# Patient Record
Sex: FEMALE | Race: WHITE | Employment: OTHER | URBAN - METROPOLITAN AREA
[De-identification: names, ages, dates, MRNs, and addresses within clinical notes are randomized per-mention and may not be internally consistent; named-entity substitution may affect disease eponyms.]

---

## 2017-01-13 RX ORDER — ERGOCALCIFEROL 1.25 MG/1
CAPSULE ORAL
Qty: 26 CAP | Refills: 0 | Status: SHIPPED | OUTPATIENT
Start: 2017-01-13 | End: 2022-05-25

## 2018-06-04 PROBLEM — N81.6 RECTOCELE: Status: ACTIVE | Noted: 2018-06-04

## 2018-06-04 PROBLEM — N39.46 MIXED INCONTINENCE: Status: ACTIVE | Noted: 2018-06-04

## 2018-06-04 PROBLEM — R35.1 NOCTURIA: Status: ACTIVE | Noted: 2018-06-04

## 2018-06-04 PROBLEM — N32.81 URGENCY-FREQUENCY SYNDROME: Status: ACTIVE | Noted: 2018-06-04

## 2018-09-13 PROBLEM — Z98.890 POST-OPERATIVE STATE: Status: ACTIVE | Noted: 2018-09-13

## 2018-10-03 PROBLEM — N32.81 OVERACTIVE BLADDER: Status: ACTIVE | Noted: 2018-10-03

## 2018-10-13 PROBLEM — Z98.890 POST-OPERATIVE STATE: Status: RESOLVED | Noted: 2018-09-13 | Resolved: 2018-10-13

## 2019-07-18 ENCOUNTER — OFFICE VISIT (OUTPATIENT)
Dept: INTERNAL MEDICINE CLINIC | Age: 69
End: 2019-07-18

## 2019-07-18 VITALS
WEIGHT: 134.38 LBS | DIASTOLIC BLOOD PRESSURE: 78 MMHG | SYSTOLIC BLOOD PRESSURE: 135 MMHG | HEIGHT: 64 IN | HEART RATE: 66 BPM | RESPIRATION RATE: 16 BRPM | BODY MASS INDEX: 22.94 KG/M2 | TEMPERATURE: 98 F | OXYGEN SATURATION: 99 %

## 2019-07-18 DIAGNOSIS — E03.9 HYPOTHYROIDISM, UNSPECIFIED TYPE: ICD-10-CM

## 2019-07-18 DIAGNOSIS — E55.9 VITAMIN D DEFICIENCY: ICD-10-CM

## 2019-07-18 DIAGNOSIS — M85.80 OSTEOPENIA, UNSPECIFIED LOCATION: Primary | ICD-10-CM

## 2019-07-18 DIAGNOSIS — Z00.00 WELL ADULT HEALTH CHECK: ICD-10-CM

## 2019-07-18 DIAGNOSIS — N95.9 POST MENOPAUSAL PROBLEMS: ICD-10-CM

## 2019-07-18 RX ORDER — ESTRADIOL 1 MG/1
1 TABLET ORAL DAILY
Qty: 90 TAB | Refills: 0 | Status: SHIPPED | OUTPATIENT
Start: 2019-07-18 | End: 2019-07-18 | Stop reason: SDUPTHER

## 2019-07-18 RX ORDER — ESTRADIOL 1 MG/1
1 TABLET ORAL DAILY
Qty: 90 TAB | Refills: 0 | Status: SHIPPED | OUTPATIENT
Start: 2019-07-18 | End: 2019-12-09 | Stop reason: SDUPTHER

## 2019-07-18 NOTE — PATIENT INSTRUCTIONS
1.  Once labs result will sent levothyroxine refill to mail-order pharmacy. 2.  Will clarify colonoscopy interval with GI as reviewed. 3.  We can update your Medicare Annual Wellness Visit on schedule once due.

## 2019-07-18 NOTE — PROGRESS NOTES
RM 17    Patient not fasting at this time, had protein shake for breakfast. Patient has concerns with Vit D, states she was taking a high dose for years. Chief Complaint   Patient presents with    New Patient    Thyroid Problem     1. Have you been to the ER, urgent care clinic since your last visit? Hospitalized since your last visit? No    2. Have you seen or consulted any other health care providers outside of the [address] [place] [address] Aj since your last visit? Include any pap smears or colon screening. Yes Reason for visit: GP in [place], annual check ups (unsure of last office visit)     Health Maintenance Due   Topic Date Due    Hepatitis C Screening  1950    DTaP/Tdap/Td series (1 - Tdap) 06/27/1971    Shingrix Vaccine Age 50> (1 of 2) 06/27/2000    GLAUCOMA SCREENING Q2Y  06/27/2015    Pneumococcal 65+ years (2 of 2 - PPSV23) 09/09/2016    BREAST CANCER SCRN MAMMOGRAM  02/01/2017    COLONOSCOPY  02/03/2017    MEDICARE YEARLY EXAM  03/14/2018       Abuse Screening Questionnaire 7/18/2019   Do you ever feel afraid of your partner? N   Are you in a relationship with someone who physically or mentally threatens you? N   Is it safe for you to go home?  Y     3 most recent PHQ Screens 7/18/2019   Little interest or pleasure in doing things Not at all   Feeling down, depressed, irritable, or hopeless Not at all   Total Score PHQ 2 0     ADL Assessment 7/18/2019   Feeding yourself No Help Needed   Getting from bed to chair No Help Needed   Getting dressed No Help Needed   Bathing or showering No Help Needed   Walk across the room (includes cane/walker) No Help Needed   Using the telphone No Help Needed   Taking your medications No Help Needed   Preparing meals No Help Needed   Managing money (expenses/bills) No Help Needed   Moderately strenuous housework (laundry) No Help Needed   Shopping for personal items (toiletries/medicines) No Help Needed   Shopping for groceries No Help Needed   Driving No Help Needed   Climbing a flight of stairs No Help Needed   Getting to places beyond walking distances No Help Needed       Learning Assessment 7/18/2019   PRIMARY LEARNER Patient   BARRIERS PRIMARY LEARNER NONE   PRIMARY LANGUAGE ENGLISH   LEARNER PREFERENCE PRIMARY READING     LISTENING     VIDEOS   ANSWERED BY patient   RELATIONSHIP SELF

## 2019-07-18 NOTE — TELEPHONE ENCOUNTER
Pt here for visit. Updating labs tomorrow, and plan refill thyroid medication once labs result to mail-order.

## 2019-07-18 NOTE — PROGRESS NOTES
History of Present Illness:   Aj Villatoro is a 71 y.o. female here for evaluation:    Chief Complaint   Patient presents with    New Patient    Thyroid Problem     Here to establish care. Last seen by me 2015. Last thyroid refill in [facility] 2017 with Dr. Emma Salmon for thyroid mgt. She notes moved back recently from [facility]. She notes no need to see Dr. Emma Salmon at this time. She has been on vit D 50K weekly for vitamin D repletion. She is not sure if protein shake she had today had fat in it. Reviewed labs and monitoring. Nursing screenings reviewed by provider at visit. Prior to Admission medications    Medication Sig Start Date End Date Taking? Authorizing Provider   VITAMIN D2 50,000 unit capsule TAKE ONE CAPSULE BY MOUTH ONCE A WEEK  Patient taking differently: 2,000 Units. 1/13/17  Yes Jessa Way MD   levothyroxine (SYNTHROID) 88 mcg tablet TAKE 1 TABLET DAILY 6 DAYS A WEEK AND ONE-HALF (1/2) TABLET 1 DAY PER WEEK AS DIRECTED 6/30/16  Yes Jessa Way MD   omeprazole (PRILOSEC) 20 mg capsule Take 20 mg by mouth daily. Indications: 3x/weekly   Yes Provider, Historical   estradiol (ESTRACE) 1 mg tablet Take 1 mg by mouth daily. Yes Provider, Historical   GLUCOSAMINE SULFATE (GLUCOSAMINE PO) Take 2 Tabs by mouth daily. Yes Provider, Historical        ROS  Complete ROS reviewed by provider and scanned to chart. Vitals:    07/18/19 0941   BP: 135/78   Pulse: 66   Resp: 16   Temp: 98 °F (36.7 °C)   TempSrc: Oral   SpO2: 99%   Weight: 134 lb 6 oz (61 kg)   Height: 5' 4\" (1.626 m)   PainSc:   0 - No pain      Body mass index is 23.07 kg/m². Physical Exam:     Physical Exam   Constitutional: She appears well-developed and well-nourished. No distress. HENT:   Head: Normocephalic and atraumatic. Eyes: Conjunctivae are normal. Right eye exhibits no discharge. Left eye exhibits no discharge. No scleral icterus. Neck: Normal range of motion. Neck supple.  No tracheal deviation present. No thyromegaly present. Cardiovascular: Normal rate, regular rhythm, normal heart sounds and intact distal pulses. Exam reveals no gallop and no friction rub. No murmur heard. Pulmonary/Chest: Effort normal and breath sounds normal. No stridor. No respiratory distress. She has no wheezes. She has no rales. She exhibits no tenderness. Abdominal: Soft. Bowel sounds are normal. She exhibits no distension. There is no tenderness. Musculoskeletal: She exhibits no edema or tenderness. Lymphadenopathy:     She has no cervical adenopathy. Neurological: She is alert. She exhibits normal muscle tone. Coordination normal.   Skin: Skin is warm. No rash noted. She is not diaphoretic. No erythema. No pallor. Psychiatric: She has a normal mood and affect. Her behavior is normal. Judgment and thought content normal.       Assessment and Plan:       ICD-10-CM ICD-9-CM    1. Osteopenia, unspecified location M85.80 733.90 CBC WITH AUTOMATED DIFF      METABOLIC PANEL, COMPREHENSIVE      estradiol (ESTRACE) 1 mg tablet      DISCONTINUED: estradiol (ESTRACE) 1 mg tablet   2. Well adult health check Z00.00 V70.0 LIPID PANEL      HEMOGLOBIN A1C WITH EAG   3. Vitamin D deficiency E55.9 268.9 VITAMIN D, 25 HYDROXY   4. Hypothyroidism, unspecified type E03.9 244.9 LIPID PANEL      TSH AND FREE T4   5. Post menopausal problems N95.9 627.9 estradiol (ESTRACE) 1 mg tablet             Updating labs reviewed. 5.  Continue/refill pending eval with Gyn reviewed. Long-term use and concerns reviewed at visit. Follow-up and Dispositions    · Return in about 3 months (around 10/18/2019) for medication follow-up.       lab results and schedule of future lab studies reviewed with patient  reviewed medications and side effects in detail    For additional documentation of information and/or recommendations discussed this visit, please see notes in instructions.       Plan and evaluation (above) reviewed with pt at visit  Patient voiced understanding of plan and provided with time to ask/review questions. After Visit Summary (AVS) provided to pt after visit with additional instructions as needed/reviewed.

## 2019-07-22 ENCOUNTER — HOSPITAL ENCOUNTER (OUTPATIENT)
Dept: LAB | Age: 69
Discharge: HOME | End: 2019-07-22
Payer: MEDICARE

## 2019-07-22 PROCEDURE — 84443 ASSAY THYROID STIM HORMONE: CPT

## 2019-07-22 PROCEDURE — 80061 LIPID PANEL: CPT

## 2019-07-22 PROCEDURE — 85025 COMPLETE CBC W/AUTO DIFF WBC: CPT

## 2019-07-22 PROCEDURE — 80053 COMPREHEN METABOLIC PANEL: CPT

## 2019-07-22 PROCEDURE — 82306 VITAMIN D 25 HYDROXY: CPT

## 2019-07-22 PROCEDURE — 36415 COLL VENOUS BLD VENIPUNCTURE: CPT

## 2019-07-22 PROCEDURE — 83036 HEMOGLOBIN GLYCOSYLATED A1C: CPT

## 2019-08-02 RX ORDER — LEVOTHYROXINE SODIUM 88 UG/1
TABLET ORAL
Qty: 90 TAB | Refills: 1 | Status: SHIPPED | OUTPATIENT
Start: 2019-08-02 | End: 2019-10-26 | Stop reason: SDUPTHER

## 2019-08-02 NOTE — TELEPHONE ENCOUNTER
Refill request(s) approved--levothyroxine. Based on recent labs, increase from 88mcg (taking 6.5 days/week) to 88mcg daily (7 days/week). MyChart message to pt--with lab results and recommendations/plan.

## 2019-10-18 ENCOUNTER — HOSPITAL ENCOUNTER (OUTPATIENT)
Dept: LAB | Age: 69
Discharge: HOME | End: 2019-10-18
Payer: MEDICARE

## 2019-10-18 ENCOUNTER — OFFICE VISIT (OUTPATIENT)
Dept: INTERNAL MEDICINE CLINIC | Age: 69
End: 2019-10-18

## 2019-10-18 VITALS
RESPIRATION RATE: 14 BRPM | BODY MASS INDEX: 22.92 KG/M2 | WEIGHT: 134.25 LBS | DIASTOLIC BLOOD PRESSURE: 61 MMHG | OXYGEN SATURATION: 99 % | HEIGHT: 64 IN | SYSTOLIC BLOOD PRESSURE: 112 MMHG | TEMPERATURE: 97.5 F | HEART RATE: 69 BPM

## 2019-10-18 DIAGNOSIS — Z12.39 SCREENING FOR BREAST CANCER: ICD-10-CM

## 2019-10-18 DIAGNOSIS — M79.642 PAIN IN BOTH HANDS: ICD-10-CM

## 2019-10-18 DIAGNOSIS — M79.641 PAIN IN BOTH HANDS: ICD-10-CM

## 2019-10-18 DIAGNOSIS — E03.9 HYPOTHYROIDISM, UNSPECIFIED TYPE: Primary | ICD-10-CM

## 2019-10-18 DIAGNOSIS — Z12.11 SCREENING FOR MALIGNANT NEOPLASM OF COLON: ICD-10-CM

## 2019-10-18 DIAGNOSIS — Z12.31 ENCOUNTER FOR SCREENING MAMMOGRAM FOR MALIGNANT NEOPLASM OF BREAST: ICD-10-CM

## 2019-10-18 PROCEDURE — 84443 ASSAY THYROID STIM HORMONE: CPT

## 2019-10-18 NOTE — PROGRESS NOTES
RM 17    Patient had flu and shingles vaccine through pharmacy. Patient would like mammogram ordered. Chief Complaint   Patient presents with    Medication Evaluation    Labs     review     1. Have you been to the ER, urgent care clinic since your last visit? Hospitalized since your last visit? No    2. Have you seen or consulted any other health care providers outside of the [address] [place] [address] since your last visit? Include any pap smears or colon screening. No    Health Maintenance Due   Topic Date Due    Hepatitis C Screening  1950    DTaP/Tdap/Td series (1 - Tdap) 06/27/1971    Shingrix Vaccine Age 50> (1 of 2) 06/27/2000    GLAUCOMA SCREENING Q2Y  06/27/2015    Pneumococcal 65+ years (2 of 2 - PPSV23) 09/09/2016    BREAST CANCER SCRN MAMMOGRAM  02/01/2017    COLONOSCOPY  02/03/2017    MEDICARE YEARLY EXAM  03/14/2018    Influenza Age 9 to Adult  08/01/2019     Abuse Screening Questionnaire 10/18/2019   Do you ever feel afraid of your partner? N   Are you in a relationship with someone who physically or mentally threatens you? N   Is it safe for you to go home?  Y     3 most recent PHQ Screens 10/18/2019   Little interest or pleasure in doing things Not at all   Feeling down, depressed, irritable, or hopeless Not at all   Total Score PHQ 2 0

## 2019-10-18 NOTE — PROGRESS NOTES
History of Present Illness:   Liam Blank is a 71 y.o. female here for evaluation:    Chief Complaint   Patient presents with    Medication Evaluation    Labs     review     Reviewed mammogram, hand arthritis, thyroid, medicare AWC--on since 65--not done here. Has not seen gyn. Still taking Estadiol. Notes prefers on exam, just mammogram.  Has total hysterectomy and no gyn care needed per pt otherwise. Nursing screenings reviewed by provider at visit. Prior to Admission medications    Medication Sig Start Date End Date Taking? Authorizing Provider   levothyroxine (SYNTHROID) 88 mcg tablet TAKE 1 TABLET DAILY 7 DAYS A WEEK. 8/2/19  Yes Alex Aranda MD   estradiol (ESTRACE) 1 mg tablet Take 1 Tab by mouth daily. 7/18/19  Yes Alex Aranda MD   VITAMIN D2 50,000 unit capsule TAKE ONE CAPSULE BY MOUTH ONCE A WEEK  Patient taking differently: 2,000 Units. 1/13/17  Yes Marcelino Puente MD   omeprazole (PRILOSEC) 20 mg capsule Take 20 mg by mouth daily. Indications: 3x/weekly   Yes Provider, Historical   GLUCOSAMINE SULFATE (GLUCOSAMINE PO) Take 2 Tabs by mouth daily. Yes Provider, Historical        ROS    Vitals:    10/18/19 1405   BP: 112/61   Pulse: 69   Resp: 14   Temp: 97.5 °F (36.4 °C)   TempSrc: Oral   SpO2: 99%   Weight: 134 lb 4 oz (60.9 kg)   Height: 5' 4\" (1.626 m)   PainSc:   0 - No pain      Body mass index is 23.04 kg/m². Physical Exam:     Physical Exam   Constitutional: She appears well-developed and well-nourished. No distress. HENT:   Head: Normocephalic and atraumatic. Eyes: Conjunctivae are normal. Right eye exhibits no discharge. Left eye exhibits no discharge. No scleral icterus. Neck: Neck supple. Cardiovascular: Normal rate, regular rhythm, normal heart sounds and intact distal pulses. Exam reveals no gallop and no friction rub. No murmur heard. Pulmonary/Chest: Effort normal and breath sounds normal. No respiratory distress.  She has no wheezes. She has no rales. She exhibits no tenderness. Abdominal: Soft. Bowel sounds are normal. She exhibits no distension. There is no tenderness. Musculoskeletal: She exhibits no edema or tenderness. Neurological: She is alert. She exhibits normal muscle tone. Coordination normal.   Skin: Skin is warm. No rash noted. She is not diaphoretic. No erythema. No pallor. Psychiatric: She has a normal mood and affect. Her behavior is normal. Judgment and thought content normal.       Assessment and Plan:       ICD-10-CM ICD-9-CM    1. Hypothyroidism, unspecified type E03.9 244.9 TSH AND FREE T4   2. Screening for breast cancer Z12.39 V76.10 PAMELA MAMMO BI SCREENING INCL CAD   3. Encounter for screening mammogram for malignant neoplasm of breast  Z12.31 V76.12 PAMELA MAMMO BI SCREENING INCL CAD   4. Pain in both hands M79.641 729.5     M79.642     5. Screening for malignant neoplasm of colon Z12.11 V76.51 REFERRAL TO GASTROENTEROLOGY      CANCELED: REFERRAL TO GASTROENTEROLOGY       1.  Updating labs reviewed. 2,3:  Screening/order reviewed. 5.  Referral to different provider in same group as Dr. Carolina Tinsley reviewed. She will call to clarify interval--not done while she was in [facility]. Follow-up and Dispositions    · Return in about 9 months (around 7/18/2020), or if symptoms worsen or fail to improve, for Medicare Wellness exam/visit, fasting labs. lab results and schedule of future lab studies reviewed with patient  reviewed medications and side effects in detail    Plan and evaluation (above) reviewed with pt at visit  Patient voiced understanding of plan and provided with time to ask/review questions. After Visit Summary (AVS) provided to pt after visit with additional instructions as needed/reviewed. No future appointments.

## 2019-10-19 LAB
T4 FREE SERPL-MCNC: 1.61 NG/DL (ref 0.82–1.77)
TSH SERPL DL<=0.005 MIU/L-ACNC: 1.24 UIU/ML (ref 0.45–4.5)

## 2019-11-02 RX ORDER — LEVOTHYROXINE SODIUM 88 UG/1
TABLET ORAL
Qty: 90 TAB | Refills: 1 | Status: SHIPPED | OUTPATIENT
Start: 2019-11-02 | End: 2020-04-10 | Stop reason: SDUPTHER

## 2019-11-03 NOTE — TELEPHONE ENCOUNTER
Refill request(s) approved--levothyroxine--same dose. MyChart message to pt--with lab results.     Component      Latest Ref Rng & Units 10/18/2019 7/22/2019           2:47 PM  8:28 AM   TSH      0.450 - 4.500 uIU/mL 1.240 6.710 (H)   T4, Free      0.82 - 1.77 ng/dL 1.61 1.19

## 2019-11-13 ENCOUNTER — HOSPITAL ENCOUNTER (OUTPATIENT)
Dept: MAMMOGRAPHY | Age: 69
Discharge: HOME | End: 2019-11-13
Attending: INTERNAL MEDICINE
Payer: MEDICARE

## 2019-11-13 DIAGNOSIS — Z12.31 VISIT FOR SCREENING MAMMOGRAM: ICD-10-CM

## 2019-11-13 PROCEDURE — 77067 SCR MAMMO BI INCL CAD: CPT

## 2019-12-09 DIAGNOSIS — M85.80 OSTEOPENIA, UNSPECIFIED LOCATION: ICD-10-CM

## 2019-12-09 DIAGNOSIS — N95.9 POST MENOPAUSAL PROBLEMS: ICD-10-CM

## 2019-12-28 RX ORDER — ESTRADIOL 1 MG/1
1 TABLET ORAL DAILY
Qty: 90 TAB | Refills: 0 | Status: SHIPPED | OUTPATIENT
Start: 2019-12-28

## 2019-12-28 NOTE — TELEPHONE ENCOUNTER
Pt plans to see gyn in next 90-days. Requested 90-day estradiol PO script. Refill request(s) approved--estradiol--to mail-order as requested. Requested Prescriptions     Signed Prescriptions Disp Refills    estradiol (ESTRACE) 1 mg tablet 90 Tab 0     Sig: Take 1 Tab by mouth daily.      Authorizing Provider: Lorena Linares

## 2020-04-11 RX ORDER — LEVOTHYROXINE SODIUM 88 UG/1
TABLET ORAL
Qty: 90 TAB | Refills: 1 | Status: SHIPPED | OUTPATIENT
Start: 2020-04-11 | End: 2020-08-17 | Stop reason: SDUPTHER

## 2020-04-20 ENCOUNTER — VIRTUAL VISIT (OUTPATIENT)
Dept: INTERNAL MEDICINE CLINIC | Age: 70
End: 2020-04-20

## 2020-04-20 DIAGNOSIS — E03.9 HYPOTHYROIDISM, UNSPECIFIED TYPE: ICD-10-CM

## 2020-04-20 DIAGNOSIS — Z00.00 MEDICARE ANNUAL WELLNESS VISIT, SUBSEQUENT: Primary | ICD-10-CM

## 2020-04-20 DIAGNOSIS — M85.80 OSTEOPENIA, UNSPECIFIED LOCATION: ICD-10-CM

## 2020-04-20 DIAGNOSIS — Z13.31 SCREENING FOR DEPRESSION: ICD-10-CM

## 2020-04-20 DIAGNOSIS — Z11.59 NEED FOR HEPATITIS C SCREENING TEST: ICD-10-CM

## 2020-04-20 DIAGNOSIS — E55.9 VITAMIN D DEFICIENCY: ICD-10-CM

## 2020-04-20 DIAGNOSIS — Z13.39 SCREENING FOR ALCOHOLISM: ICD-10-CM

## 2020-04-20 RX ORDER — DIPHENHYDRAMINE CITRATE AND IBUPROFEN 38; 200 MG/1; MG/1
TABLET, COATED ORAL DAILY
COMMUNITY

## 2020-04-20 RX ORDER — ZOSTER VACCINE RECOMBINANT, ADJUVANTED 50 MCG/0.5
KIT INTRAMUSCULAR
COMMUNITY
End: 2021-05-24

## 2020-04-20 RX ORDER — INFLUENZA VACCINE, ADJUVANTED 15; 15; 15 UG/.5ML; UG/.5ML; UG/.5ML
INJECTION, SUSPENSION INTRAMUSCULAR
COMMUNITY
End: 2021-05-24

## 2020-04-20 NOTE — PATIENT INSTRUCTIONS
Please have the eye exam records faxed to [phone], as reviewed/requested--once updated. Please have colonoscopy report faxed to us. Medicare Wellness Visit, Female The best way to live healthy is to have a lifestyle where you eat a well-balanced diet, exercise regularly, limit alcohol use, and quit all forms of tobacco/nicotine, if applicable. Regular preventive services are another way to keep healthy. Preventive services (vaccines, screening tests, monitoring & exams) can help personalize your care plan, which helps you manage your own care. Screening tests can find health problems at the earliest stages, when they are easiest to treat. [facility] follows the current, evidence-based guidelines published by the [facility] ([facility]) when recommending preventive services for our patients. Because we follow these guidelines, sometimes recommendations change over time as research supports it. (For example, mammograms used to be recommended annually. Even though Medicare will still pay for an annual mammogram, the newer guidelines recommend a mammogram every two years for women of average risk). Of course, you and your doctor may decide to screen more often for some diseases, based on your risk and your co-morbidities (chronic disease you are already diagnosed with). Preventive services for you include: - Medicare offers their members a free annual wellness visit, which is time for you and your primary care provider to discuss and plan for your preventive service needs. Take advantage of this benefit every year! 
-All adults over the age of 72 should receive the recommended pneumonia vaccines. Current USPSTF guidelines recommend a series of two vaccines for the best pneumonia protection.  
-All adults should have a flu vaccine yearly and a tetanus vaccine every 10 years. -All adults age 48 and older should receive the shingles vaccines (series of two vaccines). -All adults age 38-68 who are overweight should have a diabetes screening test once every three years.  
-All adults born between [birth year] and 1965 should be screened once for Hepatitis C. 
-Other screening tests and preventive services for persons with diabetes include: an eye exam to screen for diabetic retinopathy, a kidney function test, a foot exam, and stricter control over your cholesterol.  
-Cardiovascular screening for adults with routine risk involves an electrocardiogram (ECG) at intervals determined by your doctor.  
-Colorectal cancer screenings should be done for adults age 54-65 with no increased risk factors for colorectal cancer. There are a number of acceptable methods of screening for this type of cancer. Each test has its own benefits and drawbacks. Discuss with your doctor what is most appropriate for you during your annual wellness visit. The different tests include: colonoscopy (considered the best screening method), a fecal occult blood test, a fecal DNA test, and sigmoidoscopy. 
 
-A bone mass density test is recommended when a woman turns 65 to screen for osteoporosis. This test is only recommended one time, as a screening. Some providers will use this same test as a disease monitoring tool if you already have osteoporosis. -Breast cancer screenings are recommended every other year for women of normal risk, age 54-69. 
-Cervical cancer screenings for women over age 72 are only recommended with certain risk factors. Here is a list of your current Health Maintenance items (your personalized list of preventive services) with a due date: 
Health Maintenance Due Topic Date Due  
 Hepatitis C Test  1950  DTaP/Tdap/Td  (1 - Tdap) 06/27/1971  Glaucoma Screening   06/27/2015  Colonoscopy  02/03/2017 [facility] Annual Well Visit  03/14/2018 Medicare Wellness Visit, Female The best way to live healthy is to have a lifestyle where you eat a well-balanced diet, exercise regularly, limit alcohol use, and quit all forms of tobacco/nicotine, if applicable. Regular preventive services are another way to keep healthy. Preventive services (vaccines, screening tests, monitoring & exams) can help personalize your care plan, which helps you manage your own care. Screening tests can find health problems at the earliest stages, when they are easiest to treat. [facility] follows the current, evidence-based guidelines published by the [facility] ([facility]) when recommending preventive services for our patients. Because we follow these guidelines, sometimes recommendations change over time as research supports it. (For example, mammograms used to be recommended annually. Even though Medicare will still pay for an annual mammogram, the newer guidelines recommend a mammogram every two years for women of average risk). Of course, you and your doctor may decide to screen more often for some diseases, based on your risk and your co-morbidities (chronic disease you are already diagnosed with). Preventive services for you include: - Medicare offers their members a free annual wellness visit, which is time for you and your primary care provider to discuss and plan for your preventive service needs. Take advantage of this benefit every year! 
-All adults over the age of 72 should receive the recommended pneumonia vaccines. Current USPSTF guidelines recommend a series of two vaccines for the best pneumonia protection.  
-All adults should have a flu vaccine yearly and a tetanus vaccine every 10 years.  
-All adults age 48 and older should receive the shingles vaccines (series of two vaccines). -All adults age 38-68 who are overweight should have a diabetes screening test once every three years. -All adults born between 80 and 1965 should be screened once for Hepatitis C. 
-Other screening tests and preventive services for persons with diabetes include: an eye exam to screen for diabetic retinopathy, a kidney function test, a foot exam, and stricter control over your cholesterol.  
-Cardiovascular screening for adults with routine risk involves an electrocardiogram (ECG) at intervals determined by your doctor.  
-Colorectal cancer screenings should be done for adults age 54-65 with no increased risk factors for colorectal cancer. There are a number of acceptable methods of screening for this type of cancer. Each test has its own benefits and drawbacks. Discuss with your doctor what is most appropriate for you during your annual wellness visit. The different tests include: colonoscopy (considered the best screening method), a fecal occult blood test, a fecal DNA test, and sigmoidoscopy. 
 
-A bone mass density test is recommended when a woman turns 65 to screen for osteoporosis. This test is only recommended one time, as a screening. Some providers will use this same test as a disease monitoring tool if you already have osteoporosis. -Breast cancer screenings are recommended every other year for women of normal risk, age 54-69. 
-Cervical cancer screenings for women over age 72 are only recommended with certain risk factors. Here is a list of your current Health Maintenance items (your personalized list of preventive services) with a due date: 
Health Maintenance Due Topic Date Due  
 Hepatitis C Test  1950  DTaP/Tdap/Td  (1 - Tdap) 06/27/1971  Glaucoma Screening   06/27/2015  Colonoscopy  02/03/2017 [facility] Annual Well Visit  03/14/2018

## 2020-04-20 NOTE — PROGRESS NOTES
Geronimo Walton is a 71 y.o. female who was seen by synchronous (real-time) audio-video technology on 4/20/2020. Consent:  She and/or her healthcare decision maker is aware that this patient-initiated Telehealth encounter is a billable service, with coverage as determined by her insurance carrier. She is aware that she may receive a bill and has provided verbal consent to proceed: Yes    I was in the office while conducting this encounter. Subjective:   Geronimo Waltno was seen for Annual Wellness Visit      This is a Subsequent Medicare Annual Wellness Exam (AWV) (Performed 12 months after IPPE or effective date of Medicare Part B enrollment)    I have reviewed the patient's medical history in detail and updated the computerized patient record. History     Chief Complaint   Patient presents with    Annual Wellness Visit       No other concerns at this time. Plan to update labs again July 2020. She prefers to do labs here in July 2020. Past Medical History:   Diagnosis Date    Abnormal EKG Jan 2016    Possible anterior infarct--Ortho pre-op. New finding compared to prior EKG.  Encounter for screening colonoscopy 2/3/12    Dr Sellers-hemorrhoids and diverticulosis-repeat 5 years    GERD (gastroesophageal reflux disease)     Hypothyroidism     Kidney infection age 21    Osteopenia     at femoral neck T-score -1.4 in 2/10    Unspecified vitamin D deficiency     Varicella vaccine August 2011      Past Surgical History:   Procedure Laterality Date    ABDOMEN SURGERY PROC UNLISTED  2007    lap nissen fundoplication    APPENDECTOMY      HX GYN  1990    Hysterectomy    HX ORTHOPAEDIC  Feb 2014    Left shoulder arthroscopy and manipulation with lysis    HX ORTHOPAEDIC Right Jan 11, 2016    Small finger PIP join tfusion surgery (fracture)     HX TOTAL ABDOMINAL HYSTERECTOMY       Prior to Admission medications    Medication Sig Start Date End Date Taking?  Authorizing Provider levothyroxine (SYNTHROID) 88 mcg tablet TAKE 1 TABLET DAILY 7 DAYS A WEEK. 4/11/20  Yes Servando Salamanca MD   estradiol (ESTRACE) 1 mg tablet Take 1 Tab by mouth daily. 12/28/19  Yes Servando Salamanca MD   VITAMIN D2 50,000 unit capsule TAKE ONE CAPSULE BY MOUTH ONCE A WEEK  Patient taking differently: 2,000 Units. 1/13/17  Yes Momo Chen MD   omeprazole (PRILOSEC) 20 mg capsule Take 20 mg by mouth daily. Indications: 3x/weekly   Yes Provider, Historical   GLUCOSAMINE SULFATE (GLUCOSAMINE PO) Take 2 Tabs by mouth daily. Yes Provider, Historical   Ibuprofen-diphenhydrAMINE (Advil PM) 200-38 mg tab by Mouth/Throat route daily. Provider, Historical   varicella-zoster recombinant, PF, (Shingrix, PF,) 50 mcg/0.5 mL susr injection Shingrix (PF) 50 mcg/0.5 mL intramuscular suspension, kit    Provider, Historical   influenza vaccine 2019-20, 65 yrs+,,PF, (Fluad 2348-7740, 72 yr up,,PF,) syrg injection Fluad 2019-20 65yr up(PF)45 mcg(15 mcgx3)/0.5 mL intramuscular syringe   INJECT 0.5ML INTRAMUSCULARLY ONCE    Provider, Historical       No Known Allergies  Family History   Problem Relation Age of Onset    Osteoporosis Mother         hip fracture    Cancer Brother     Heart Disease Brother     Diabetes Maternal Aunt     Breast Cancer Maternal Aunt     Thyroid Disease Neg Hx      Social History     Tobacco Use    Smoking status: Never Smoker    Smokeless tobacco: Never Used   Substance Use Topics    Alcohol use:  Yes     Alcohol/week: 4.2 standard drinks     Types: 5 Glasses of wine per week     Patient Active Problem List   Diagnosis Code    Pure hypercholesterolemia E78.00    Osteopenia M85.80    GERD (gastroesophageal reflux disease) K21.9    Unspecified hypothyroidism E03.9    Diverticulosis K57.90    Vitamin D deficiency E55.9    Hypothyroidism E03.9    Unspecified vitamin D deficiency E55.9    Anemia D64.9       Depression Risk Factor Screening:     3 most recent PHQ Screens 4/20/2020   Little interest or pleasure in doing things Not at all   Feeling down, depressed, irritable, or hopeless Not at all   Total Score PHQ 2 0       Alcohol Risk Factor Screening:     Alcohol Risk Factor Screening:   Do you average 1 drink per night or more than 7 drinks a week:  Yes--wine nightly    On any one occasion in the past three months have you have had more than 3 drinks containing alcohol:  No    Functional Ability and Level of Safety:     Hearing Loss  Hearing is good. Activities of Daily Living  The home contains: grab bars  See below:    ADL Assessment 4/20/2020   Feeding yourself No Help Needed   Getting from bed to chair No Help Needed   Getting dressed No Help Needed   Bathing or showering No Help Needed   Walk across the room (includes cane/walker) No Help Needed   Using the telphone No Help Needed   Taking your medications No Help Needed   Preparing meals No Help Needed   Managing money (expenses/bills) No Help Needed   Moderately strenuous housework (laundry) No Help Needed   Shopping for personal items (toiletries/medicines) No Help Needed   Shopping for groceries No Help Needed   Driving No Help Needed   Climbing a flight of stairs No Help Needed   Getting to places beyond walking distances No Help Needed       Fall Risk  Fall Risk Assessment, last 12 mths 4/20/2020   Able to walk? Yes   Fall in past 12 months? No       Abuse Screen  Patient is not abused    Abuse Screening Questionnaire 4/20/2020   Do you ever feel afraid of your partner? N   Are you in a relationship with someone who physically or mentally threatens you? N   Is it safe for you to go home?  Y       Cognitive Screening   Evaluation of Cognitive Function:  Has your family/caregiver stated any concerns about your memory: no      Patient Care Team   Patient Care Team:  Radha Kiran MD as PCP - General (Internal Medicine)  Radha Kiran MD [facility] PCP [facility] Empaneled Provider    Advice/Referrals/Counseling Education and counseling provided:  Are appropriate based on today's review and evaluation  Colorectal cancer screening tests  Screening for glaucoma      Health Maintenance Due   Topic Date Due    Hepatitis C Screening  1950    DTaP/Tdap/Td series (1 - Tdap) 06/27/1971    GLAUCOMA SCREENING Q2Y  06/27/2015    Colonoscopy  02/03/2017    Medicare Yearly Exam  03/14/2018    Shingrix Vaccine Age 50> (2 of 2) 11/28/2019     Plan update Hep C with next labs. Has not done Tdap--will update in pharmacy as reviewed. She has eye exam upcoming and will have faxed to us once completed. She will have GI fax last colonoscopy--she had at [address] old and told no problems--thinks planned 10yr interval follow-up then. She has completed Shingrix--record able to be retrieved from external vaccines. ROS      No Known Allergies      PHYSICAL EXAMINATION:    Vital Signs: There were no vitals taken for this visit. Constitutional: [x] Appears well-developed and well-nourished [x] No apparent distress      Mental status: [x] Alert and awake  [x] Oriented [x] Able to follow commands       Eyes:   EOM    [x]  Normal      Sclera  [x]  Normal              Discharge [x]  None visible       HENT: [x] Normocephalic, atraumatic    [x] Mouth/Throat: Mucous membranes are moist    External Ears [x] Normal      Neck: [x] No visualized mass     Pulmonary/Chest: [x] Respiratory effort normal   [x] No visualized signs of difficulty breathing or respiratory distress    Musculoskeletal:  [x] Normal range of motion of neck    Neurological:        [x] No Facial Asymmetry (Cranial nerve 7 motor function) (limited exam due to video visit)          [x] No gaze palsy     Skin:        [x] No significant exanthematous lesions or discoloration noted on facial skin             Psychiatric:       [x] Normal Affect       Other pertinent observable physical exam findings:  None.       We discussed the expected course, resolution and complications of the diagnosis(es) in detail. Medication risks, benefits, costs, interactions, and alternatives were discussed as indicated. I advised her to contact the office if her condition worsens, changes or fails to improve as anticipated. She expressed understanding with the diagnosis(es) and plan. Pursuant to the emergency declaration under the [facility] [place] [address] waiver authority and the [employer] Act, this Virtual  Visit was conducted, with patient's consent, to reduce the patient's risk of exposure to COVID-19 and provide continuity of care for an established patient. Services were provided through a video synchronous discussion virtually to substitute for in-person clinic visit. Assessment & Plan:   Diagnoses and all orders for this visit:      ICD-10-CM ICD-9-CM    1. Medicare annual wellness visit, subsequent Z00.00 V70.0 CBC WITH AUTOMATED DIFF      METABOLIC PANEL, COMPREHENSIVE      LIPID PANEL      HEMOGLOBIN A1C WITH EAG   2. Screening for alcoholism Z13.39 V79.1    3. Screening for depression Z13.31 V79.0 DEPRESSION SCREEN ANNUAL   4. Need for hepatitis C screening test Z11.59 V73.89 HCV AB W/RFLX TO YADIRA   5. Osteopenia, unspecified location M85.80 733.90 VITAMIN D, 25 HYDROXY   6. Hypothyroidism, unspecified type E03.9 244.9 TSH AND FREE T4   7. Vitamin D deficiency E55.9 268.9 VITAMIN D, 25 HYDROXY       Diagnoses #1-4 for Medicare Wellness/Preventive visit. Due to significant separate problems unrelated to [facility] Wellness Visit, also addressed following diagnoses/problems at visit as below (diagnoses #5-7 above). 1, 4-7:  Plans fasting labs here--ordered labs above as future clinic collect to do here. 4.  Birth cohort screening reviewed. 5,6,7:  Monitoring, medications and follow-up reviewed.       Follow-up and Dispositions    · Return in about 3 months (around 7/20/2020) for fasting labs; 1yr for Annual Wellness visit.       lab results and schedule of future lab studies reviewed with patient  reviewed medications and side effects in detail    For additional documentation of information and/or recommendations discussed this visit, please see notes in instructions. Plan and evaluation (above) reviewed with pt at visit  Patient voiced understanding of plan and provided with time to ask/review questions. After Visit Summary (AVS) provided to pt after visit with additional instructions as needed/reviewed. AVS:  [x]  Sent to patient as YYogat message after visit. []  Mailed to patient after visit. []  Not sent to patient after visit. No future appointments.

## 2020-04-20 NOTE — PROGRESS NOTES
Virtual visit-doxy me video    Chief Complaint   Patient presents with   [address] [facility] Annual Wellness Visit     1. Have you been to the ER, urgent care clinic since your last visit? Hospitalized since your last visit? No    2. Have you seen or consulted any other health care providers outside of the [address] [place] [address] since your last visit? Include any pap smears or colon screening. No    Health Maintenance Due   Topic Date Due    Hepatitis C Screening  1950    DTaP/Tdap/Td series (1 - Tdap) 06/27/1971    GLAUCOMA SCREENING Q2Y  06/27/2015    Colonoscopy  02/03/2017    Medicare Yearly Exam  03/14/2018    Shingrix Vaccine Age 50> (2 of 2) 11/28/2019     ADL Assessment 4/20/2020   Feeding yourself No Help Needed   Getting from bed to chair No Help Needed   Getting dressed No Help Needed   Bathing or showering No Help Needed   Walk across the room (includes cane/walker) No Help Needed   Using the telphone No Help Needed   Taking your medications No Help Needed   Preparing meals No Help Needed   Managing money (expenses/bills) No Help Needed   Moderately strenuous housework (laundry) No Help Needed   Shopping for personal items (toiletries/medicines) No Help Needed   Shopping for groceries No Help Needed   Driving No Help Needed   Climbing a flight of stairs No Help Needed   Getting to places beyond walking distances No Help Needed       Abuse Screening Questionnaire 4/20/2020   Do you ever feel afraid of your partner? N   Are you in a relationship with someone who physically or mentally threatens you? N   Is it safe for you to go home? Y     3 most recent PHQ Screens 4/20/2020   Little interest or pleasure in doing things Not at all   Feeling down, depressed, irritable, or hopeless Not at all   Total Score PHQ 2 0     Fall Risk Assessment, last 12 mths 4/20/2020   Able to walk? Yes   Fall in past 12 months?  No

## 2020-08-19 RX ORDER — LEVOTHYROXINE SODIUM 88 UG/1
TABLET ORAL
Qty: 90 TAB | Refills: 1 | Status: SHIPPED | OUTPATIENT
Start: 2020-08-19 | End: 2021-03-20

## 2021-03-20 RX ORDER — LEVOTHYROXINE SODIUM 88 UG/1
TABLET ORAL
Qty: 90 TAB | Refills: 0 | Status: SHIPPED | OUTPATIENT
Start: 2021-03-20 | End: 2021-06-02 | Stop reason: SDUPTHER

## 2021-03-20 NOTE — TELEPHONE ENCOUNTER
Refill request(s) approved--levothyroxine--90-day supply with 0 refill(s). Refill protocol details (computer-generated) reviewed, as available. Draft[employer] message to pt--to schedule follow-up appt.

## 2021-05-24 ENCOUNTER — OFFICE VISIT (OUTPATIENT)
Dept: INTERNAL MEDICINE CLINIC | Age: 71
End: 2021-05-24
Payer: MEDICARE

## 2021-05-24 VITALS
OXYGEN SATURATION: 98 % | SYSTOLIC BLOOD PRESSURE: 118 MMHG | BODY MASS INDEX: 24.14 KG/M2 | TEMPERATURE: 97.9 F | DIASTOLIC BLOOD PRESSURE: 65 MMHG | WEIGHT: 141.4 LBS | RESPIRATION RATE: 16 BRPM | HEIGHT: 64 IN | HEART RATE: 70 BPM

## 2021-05-24 DIAGNOSIS — M85.80 OSTEOPENIA, UNSPECIFIED LOCATION: ICD-10-CM

## 2021-05-24 DIAGNOSIS — Z11.4 SCREENING FOR HIV WITHOUT PRESENCE OF RISK FACTORS: ICD-10-CM

## 2021-05-24 DIAGNOSIS — Z12.11 SCREENING FOR MALIGNANT NEOPLASM OF COLON: ICD-10-CM

## 2021-05-24 DIAGNOSIS — E55.9 VITAMIN D DEFICIENCY: ICD-10-CM

## 2021-05-24 DIAGNOSIS — Z00.00 MEDICARE ANNUAL WELLNESS VISIT, SUBSEQUENT: Primary | ICD-10-CM

## 2021-05-24 DIAGNOSIS — R79.9 ABNORMAL FINDING OF BLOOD CHEMISTRY, UNSPECIFIED: ICD-10-CM

## 2021-05-24 DIAGNOSIS — E03.9 HYPOTHYROIDISM, UNSPECIFIED TYPE: ICD-10-CM

## 2021-05-24 DIAGNOSIS — Z11.59 NEED FOR HEPATITIS C SCREENING TEST: ICD-10-CM

## 2021-05-24 DIAGNOSIS — Z13.31 SCREENING FOR DEPRESSION: ICD-10-CM

## 2021-05-24 PROCEDURE — G8399 PT W/DXA RESULTS DOCUMENT: HCPCS | Performed by: INTERNAL MEDICINE

## 2021-05-24 PROCEDURE — G8536 NO DOC ELDER MAL SCRN: HCPCS | Performed by: INTERNAL MEDICINE

## 2021-05-24 PROCEDURE — G8510 SCR DEP NEG, NO PLAN REQD: HCPCS | Performed by: INTERNAL MEDICINE

## 2021-05-24 PROCEDURE — 99213 OFFICE O/P EST LOW 20 MIN: CPT | Performed by: INTERNAL MEDICINE

## 2021-05-24 PROCEDURE — 1101F PT FALLS ASSESS-DOCD LE1/YR: CPT | Performed by: INTERNAL MEDICINE

## 2021-05-24 PROCEDURE — G0439 PPPS, SUBSEQ VISIT: HCPCS | Performed by: INTERNAL MEDICINE

## 2021-05-24 PROCEDURE — G8427 DOCREV CUR MEDS BY ELIG CLIN: HCPCS | Performed by: INTERNAL MEDICINE

## 2021-05-24 PROCEDURE — 1090F PRES/ABSN URINE INCON ASSESS: CPT | Performed by: INTERNAL MEDICINE

## 2021-05-24 PROCEDURE — G8420 CALC BMI NORM PARAMETERS: HCPCS | Performed by: INTERNAL MEDICINE

## 2021-05-24 PROCEDURE — G9899 SCRN MAM PERF RSLTS DOC: HCPCS | Performed by: INTERNAL MEDICINE

## 2021-05-24 PROCEDURE — 3017F COLORECTAL CA SCREEN DOC REV: CPT | Performed by: INTERNAL MEDICINE

## 2021-05-24 PROCEDURE — G0463 HOSPITAL OUTPT CLINIC VISIT: HCPCS | Performed by: INTERNAL MEDICINE

## 2021-05-24 RX ORDER — CHOLECALCIFEROL (VITAMIN D3) 125 MCG
CAPSULE ORAL
COMMUNITY

## 2021-05-24 NOTE — PROGRESS NOTES
Chief Complaint   Patient presents with   [facility] Annual Wellness Visit       1. Have you been to the ER, urgent care clinic since your last visit? Hospitalized since your last visit? No    2. Have you seen or consulted any other health care providers outside of the [address] [place] [address] since your last visit? Include any pap smears or colon screening.  Gyn 4/2021    Visit Vitals  /65 (BP 1 Location: Left arm, BP Patient Position: Sitting)   Pulse 70   Temp 97.9 °F (36.6 °C) (Oral)   Resp 16   Ht 5' 4\" (1.626 m)   Wt 141 lb 6.4 oz (64.1 kg)   SpO2 98%   BMI 24.27 kg/m²

## 2021-05-24 NOTE — PATIENT INSTRUCTIONS
1.  Please update Tdap (Tetanus, diphtheria and acellular pertussis/\"whooping cough\") vaccine in pharmacy as reviewed. Covered there with Medicare. 2.  If needed, you can use Debrox OTC for ear wax, or return here as needed for ear irrigation. Medicare Wellness Visit, Female The best way to live healthy is to have a lifestyle where you eat a well-balanced diet, exercise regularly, limit alcohol use, and quit all forms of tobacco/nicotine, if applicable. Regular preventive services are another way to keep healthy. Preventive services (vaccines, screening tests, monitoring & exams) can help personalize your care plan, which helps you manage your own care. Screening tests can find health problems at the earliest stages, when they are easiest to treat. [facility] follows the current, evidence-based guidelines published by the [facility] ([facility]) when recommending preventive services for our patients. Because we follow these guidelines, sometimes recommendations change over time as research supports it. (For example, mammograms used to be recommended annually. Even though Medicare will still pay for an annual mammogram, the newer guidelines recommend a mammogram every two years for women of average risk). Of course, you and your doctor may decide to screen more often for some diseases, based on your risk and your co-morbidities (chronic disease you are already diagnosed with). Preventive services for you include: - Medicare offers their members a free annual wellness visit, which is time for you and your primary care provider to discuss and plan for your preventive service needs. Take advantage of this benefit every year! 
-All adults over the age of 72 should receive the recommended pneumonia vaccines.  Current USPSTF guidelines recommend a series of two vaccines for the best pneumonia protection.  
-All adults should have a flu vaccine yearly and a tetanus vaccine every 10 years.  
-All adults age 48 and older should receive the shingles vaccines (series of two vaccines). -All adults age 38-68 who are overweight should have a diabetes screening test once every three years.  
-All adults born between [birth year] and 1965 should be screened once for Hepatitis C. 
-Other screening tests and preventive services for persons with diabetes include: an eye exam to screen for diabetic retinopathy, a kidney function test, a foot exam, and stricter control over your cholesterol.  
-Cardiovascular screening for adults with routine risk involves an electrocardiogram (ECG) at intervals determined by your doctor.  
-Colorectal cancer screenings should be done for adults age 54-65 with no increased risk factors for colorectal cancer. There are a number of acceptable methods of screening for this type of cancer. Each test has its own benefits and drawbacks. Discuss with your doctor what is most appropriate for you during your annual wellness visit. The different tests include: colonoscopy (considered the best screening method), a fecal occult blood test, a fecal DNA test, and sigmoidoscopy. 
 
-A bone mass density test is recommended when a woman turns 65 to screen for osteoporosis. This test is only recommended one time, as a screening. Some providers will use this same test as a disease monitoring tool if you already have osteoporosis. -Breast cancer screenings are recommended every other year for women of normal risk, age 54-69. 
-Cervical cancer screenings for women over age 72 are only recommended with certain risk factors. Here is a list of your current Health Maintenance items (your personalized list of preventive services) with a due date: 
Health Maintenance Due Topic Date Due  
    
 DTaP/Tdap/Td  (1 - Tdap) Never done  Colorectal Screening  02/03/2017

## 2021-05-24 NOTE — PROGRESS NOTES
Aria Davis (: 1950) is a 79 y.o. female, established patient, here for evaluation of the following chief complaint(s):  see below. This is a Subsequent Medicare Annual Wellness Exam (AWV) (Performed 12 months after IPPE or effective date of Medicare Part B enrollment)    I have reviewed the patient's medical history in detail and updated the computerized patient record. Assessment/Plan       ICD-10-CM ICD-9-CM    1. Medicare annual wellness visit, subsequent  Z00.00 V70.0 CBC WITH AUTOMATED DIFF      METABOLIC PANEL, COMPREHENSIVE      HEMOGLOBIN A1C WITH EAG      LIPID PANEL   2. Screening for depression  Z13.31 V79.0    3. Osteopenia, unspecified location  M85.80 733.90 VITAMIN D, 25 HYDROXY   4. Hypothyroidism, unspecified type  E03.9 244.9 T4, FREE      TSH 3RD GENERATION   5. Vitamin D deficiency  E55.9 268.9 VITAMIN D, 25 HYDROXY   6. Screening for malignant neoplasm of colon  Z12.11 V76.51 OCCULT BLOOD IMMUNOASSAY,DIAGNOSTIC      OCCULT BLOOD IMMUNOASSAY,DIAGNOSTIC   7. Abnormal finding of blood chemistry, unspecified   R79.9 790.6 CBC WITH AUTOMATED DIFF      HEMOGLOBIN A1C WITH EAG      LIPID PANEL   8. Need for hepatitis C screening test  Z11.59 V73.89 HEPATITIS C AB   9. Screening for HIV without presence of risk factors  Z11.4 V73.89 HIV 1/2 AG/AB, 4TH GENERATION,W RFLX CONFIRM       Diagnoses #1-2, 6,8,9 for Medicare Wellness/Preventive visit. Due to significant separate problems unrelated to [facility] Wellness Visit, also addressed following diagnoses/problems at visit as below (diagnoses #3-5,7 above). 1-2:  Screenings reviewed at visit. 3-5:  Monitoring labs reviewed. No refills needed at this time. 6.  Prefers FIT at this time over colonoscopy as reviewed below.   She may check on coverage for Cologuard prior to next FIT yearly screening as reviewed at visit--to confirm coverage prior to testing.    8,9:  HIV and Hep C screenings updated with labs at visit. Follow-up and Dispositions    · Return in about 1 year (around 5/24/2022), or if symptoms worsen or fail to improve, for Medicare Wellness exam/visit.       lab results and schedule of future lab studies reviewed with patient  reviewed medications and side effects in detail    For additional documentation of information and/or recommendations discussed this visit, please see notes in instructions. Plan and evaluation (above) reviewed with pt at visit  Patient voiced understanding of plan and provided with time to ask/review questions. After Visit Summary (AVS) provided to pt after visit with additional instructions as needed/reviewed. No future appointments. --Updated future visits after patient check-out. History     Chief Complaint   Patient presents with   Doris Gutiérrez Annual Wellness Visit       Notes (nursing/rooming note):  As above    Patient notes:  Questions about screening for colon cancer reviewed. Prefers to do as FIT testing   Reviewed prior colonoscopy in 2012--no polyps, just hemorrhoids and diverticuli. She has no FH, so average risk and Cologuard and FIT would both be acceptable. Reviewed Cologuard because she had seen ads and interest in this vs other colon cancer screenings. She prefers FIT at this time and provided/reviewed at visit. Notes arthritis hands and knees. Glucosamine is helping with knee arthritis. Reviewed naproxen instead of HS ibuprofen may have better pain/anti-inflammatory effect, and longer duration of action c/t ibuprofen. Nursing screenings reviewed by provider at visit. Past Medical History:   Diagnosis Date    Abnormal EKG Jan 2016    Possible anterior infarct--Ortho pre-op. New finding compared to prior EKG.     Encounter for screening colonoscopy 2/3/12    Dr Sellers-hemorrhoids and diverticulosis-repeat 5 years    GERD (gastroesophageal reflux disease)     Hypothyroidism     Kidney infection age 21    Osteopenia     at femoral neck T-score -1.4 in 2/10    Unspecified vitamin D deficiency     Varicella vaccine August 2011      Past Surgical History:   Procedure Laterality Date    HX GYN  1990    Hysterectomy    HX ORTHOPAEDIC  Feb 2014    Left shoulder arthroscopy and manipulation with lysis    HX ORTHOPAEDIC Right Jan 11, 2016    Small finger PIP join tfusion surgery (fracture)     HX TOTAL ABDOMINAL HYSTERECTOMY      [place] ABDOMEN SURG[place] PROC UNLISTED  2007    lap nissen fundoplication    [place] APPENDECTOMY       Prior to Admission medications    Medication Sig Start Date End Date Taking? Authorizing Provider   naproxen sodium (Aleve) 220 mg cap Take  by mouth. Yes Provider, Historical   levothyroxine (SYNTHROID) 88 mcg tablet TAKE 1 TABLET DAILY 7 DAYS A WEEK. 3/20/21  Yes Susan Perkins MD   Ibuprofen-diphenhydrAMINE (Advil PM) 200-38 mg tab by Mouth/Throat route daily. Yes Provider, Historical   estradiol (ESTRACE) 1 mg tablet Take 1 Tab by mouth daily. 12/28/19  Yes Susan Perkins MD   VITAMIN D2 50,000 unit capsule TAKE ONE CAPSULE BY MOUTH ONCE A WEEK  Patient taking differently: 50,000 Units. 1/13/17  Yes Gerardo Mckinney MD   GLUCOSAMINE SULFATE (GLUCOSAMINE PO) Take 2 Tabs by mouth daily. Yes Provider, Historical   varicella-zoster recombinant, PF, (Shingrix, PF,) 50 mcg/0.5 mL susr injection Shingrix (PF) 50 mcg/0.5 mL intramuscular suspension, kit  Patient not taking: Reported on 5/24/2021    Provider, Historical   influenza vaccine 2019-20, 65 yrs+,,PF, (Fluad 5623-0365, 65 yr up,,PF,) syrg injection Fluad 2019-20 65yr up(PF)45 mcg(15 mcgx3)/0.5 mL intramuscular syringe   INJECT 0.5ML INTRAMUSCULARLY ONCE  Patient not taking: Reported on 5/24/2021    Provider, Historical   omeprazole (PRILOSEC) 20 mg capsule Take 20 mg by mouth daily.  Indications: 3x/weekly  Patient not taking: Reported on 5/24/2021    Provider, Historical       No Known Allergies  Family History   Problem Relation Age of Onset    Osteoporosis Mother         hip fracture    Cancer Brother     Heart Disease Brother     Diabetes Maternal Aunt     Breast Cancer Maternal Aunt     Thyroid Disease Neg Hx      Social History     Tobacco Use    Smoking status: Never Smoker    Smokeless tobacco: Never Used   Substance Use Topics    Alcohol use: Yes     Alcohol/week: 4.2 standard drinks     Types: 5 Glasses of wine per week     Patient Active Problem List   Diagnosis Code    Pure hypercholesterolemia E78.00    Osteopenia M85.80    GERD (gastroesophageal reflux disease) K21.9    Unspecified hypothyroidism E03.9    Diverticulosis K57.90    Vitamin D deficiency E55.9    Hypothyroidism E03.9    Unspecified vitamin D deficiency E55.9    Anemia D64.9       Depression Risk Factor Screening:     3 most recent PHQ Screens 5/24/2021   Little interest or pleasure in doing things Not at all   Feeling down, depressed, irritable, or hopeless Not at all   Total Score PHQ 2 0       Alcohol Risk Factor Screening:   Do you average more than 1 drink per night or more than 7 drinks a week:  No  On any one occasion in the past three months have you have had more than 3 drinks containing alcohol:  No    Has one glass of wine daily with her . Functional Ability and Level of Safety:     Hearing Loss  Hearing is good. Activities of Daily Living  The home contains: no safety equipment needed for herself. Has in place for .   See below:    ADL Assessment 5/24/2021   Feeding yourself No Help Needed   Getting from bed to chair No Help Needed   Getting dressed No Help Needed   Bathing or showering No Help Needed   Walk across the room (includes cane/walker) No Help Needed   Using the telphone No Help Needed   Taking your medications No Help Needed   Preparing meals No Help Needed   Managing money (expenses/bills) No Help Needed   Moderately strenuous housework (laundry) No Help Needed   Shopping for personal items (toiletries/medicines) No Help Needed   Shopping for groceries No Help Needed   Driving No Help Needed   Climbing a flight of stairs No Help Needed   Getting to places beyond walking distances No Help Needed       Fall Risk  Fall Risk Assessment, last 12 mths 5/24/2021   Able to walk? Yes   Fall in past 12 months? 0   Do you feel unsteady? 0   Are you worried about falling 0       Abuse Screen  Patient is not abused    Abuse Screening Questionnaire 5/24/2021   Do you ever feel afraid of your partner? N   Are you in a relationship with someone who physically or mentally threatens you? N   Is it safe for you to go home? Y       Cognitive Screening   Evaluation of Cognitive Function:  Has your family/caregiver stated any concerns about your memory: no      Physical Exam[de-identified]     Blood pressure 118/65, pulse 70, temperature 97.9 °F (36.6 °C), temperature source Oral, resp. rate 16, height 5' 4\" (1.626 m), weight 141 lb 6.4 oz (64.1 kg), SpO2 98 %. General appearance: alert, cooperative, no distress, appears stated age  Head: Normocephalic, without obvious abnormality, atraumatic  Eyes: conjunctivae/corneas clear. Wearing glasses. Ears: normal TM's and external ear canals AU. Moderate wax right, mild left--not occluding canal/TM visualization. Neck: supple, symmetrical, trachea midline, no adenopathy, thyroid: not enlarged, symmetric, no tenderness/mass/nodules  Back: symmetric, no curvature. ROM normal  Lungs: clear to auscultation bilaterally  Chest wall: no tenderness  Heart: regular rate and rhythm, S1, S2 normal, no murmur, click, rub or gallop  Abdomen: soft, non-tender.  Bowel sounds normal. No masses,  no organomegaly  Extremities: extremities normal, atraumatic, no cyanosis or edema  Pulses: 2+ and symmetric radial.  Skin: Skin color, texture, turgor normal. No rashes or lesions  Lymph nodes: Cervical nodes normal.  Neurologic: Grossly normal    Patient Care Team   Patient Care Team:  Shanon Ferrera MD as PCP - General (Internal Medicine)  Suellen Robison MD as PCP - [facility] Empaneled Provider    Advice/Referrals/Counseling   Education and counseling provided:  Are appropriate based on today's review and evaluation    Health Maintenance Due   Topic Date Due    Hepatitis C Screening  Never done    DTaP/Tdap/Td series (1 - Tdap) Never done    Colorectal Cancer Screening Combo  02/03/2017    Medicare Yearly Exam  04/21/2021     --Hep B and HIV screening updated at visit. --She will update Tdap in pharmacy as reviewed.  --FIT testing preferred at this time. She feels \"out\" for 24hr after anesthesia, and is primary caregiver for  who has memory problems, so prefers not to do colonoscopy at this time. An electronic signature was used to authenticate this note.   -- Fadi Horne MD

## 2021-05-25 LAB
25(OH)D3 SERPL-MCNC: 43.7 NG/ML (ref 30–100)
ALBUMIN SERPL-MCNC: 3.7 G/DL (ref 3.5–5)
ALBUMIN/GLOB SERPL: 1.2 {RATIO} (ref 1.1–2.2)
ALP SERPL-CCNC: 45 U/L (ref 45–117)
ALT SERPL-CCNC: 18 U/L (ref 12–78)
ANION GAP SERPL CALC-SCNC: 4 MMOL/L (ref 5–15)
AST SERPL-CCNC: 7 U/L (ref 15–37)
BASOPHILS # BLD: 0 K/UL (ref 0–0.1)
BASOPHILS NFR BLD: 1 % (ref 0–1)
BILIRUB SERPL-MCNC: 0.4 MG/DL (ref 0.2–1)
BUN SERPL-MCNC: 23 MG/DL (ref 6–20)
BUN/CREAT SERPL: 36 (ref 12–20)
CALCIUM SERPL-MCNC: 9.1 MG/DL (ref 8.5–10.1)
CHLORIDE SERPL-SCNC: 106 MMOL/L (ref 97–108)
CHOLEST SERPL-MCNC: 208 MG/DL
CO2 SERPL-SCNC: 29 MMOL/L (ref 21–32)
CREAT SERPL-MCNC: 0.64 MG/DL (ref 0.55–1.02)
DIFFERENTIAL METHOD BLD: NORMAL
EOSINOPHIL # BLD: 0.1 K/UL (ref 0–0.4)
EOSINOPHIL NFR BLD: 2 % (ref 0–7)
ERYTHROCYTE [DISTWIDTH] IN BLOOD BY AUTOMATED COUNT: 12.8 % (ref 11.5–14.5)
EST. AVERAGE GLUCOSE BLD GHB EST-MCNC: 108 MG/DL
GLOBULIN SER CALC-MCNC: 3.1 G/DL (ref 2–4)
GLUCOSE SERPL-MCNC: 91 MG/DL (ref 65–100)
HBA1C MFR BLD: 5.4 % (ref 4–5.6)
HCT VFR BLD AUTO: 39.2 % (ref 35–47)
HCV AB SERPL QL IA: NONREACTIVE
HCV COMMENT,HCGAC: NORMAL
HDLC SERPL-MCNC: 74 MG/DL
HDLC SERPL: 2.8 {RATIO} (ref 0–5)
HGB BLD-MCNC: 12.9 G/DL (ref 11.5–16)
HIV 1+2 AB+HIV1 P24 AG SERPL QL IA: NONREACTIVE
HIV12 RESULT COMMENT, HHIVC: NORMAL
IMM GRANULOCYTES # BLD AUTO: 0 K/UL (ref 0–0.04)
IMM GRANULOCYTES NFR BLD AUTO: 0 % (ref 0–0.5)
LDLC SERPL CALC-MCNC: 103 MG/DL (ref 0–100)
LYMPHOCYTES # BLD: 1.5 K/UL (ref 0.8–3.5)
LYMPHOCYTES NFR BLD: 25 % (ref 12–49)
MCH RBC QN AUTO: 30.9 PG (ref 26–34)
MCHC RBC AUTO-ENTMCNC: 32.9 G/DL (ref 30–36.5)
MCV RBC AUTO: 93.8 FL (ref 80–99)
MONOCYTES # BLD: 0.5 K/UL (ref 0–1)
MONOCYTES NFR BLD: 8 % (ref 5–13)
NEUTS SEG # BLD: 4.1 K/UL (ref 1.8–8)
NEUTS SEG NFR BLD: 64 % (ref 32–75)
NRBC # BLD: 0 K/UL (ref 0–0.01)
NRBC BLD-RTO: 0 PER 100 WBC
PLATELET # BLD AUTO: 227 K/UL (ref 150–400)
PMV BLD AUTO: 9.6 FL (ref 8.9–12.9)
POTASSIUM SERPL-SCNC: 4.1 MMOL/L (ref 3.5–5.1)
PROT SERPL-MCNC: 6.8 G/DL (ref 6.4–8.2)
RBC # BLD AUTO: 4.18 M/UL (ref 3.8–5.2)
SODIUM SERPL-SCNC: 139 MMOL/L (ref 136–145)
T4 FREE SERPL-MCNC: 1.2 NG/DL (ref 0.8–1.5)
TRIGL SERPL-MCNC: 155 MG/DL (ref ?–150)
TSH SERPL DL<=0.05 MIU/L-ACNC: 3.42 UIU/ML (ref 0.36–3.74)
VLDLC SERPL CALC-MCNC: 31 MG/DL
WBC # BLD AUTO: 6.3 K/UL (ref 3.6–11)

## 2021-05-29 LAB
HEMOCCULT STL QL IA: NEGATIVE
SPECIMEN STATUS REPORT, ROLRST: NORMAL

## 2021-05-31 ENCOUNTER — PATIENT MESSAGE (OUTPATIENT)
Dept: INTERNAL MEDICINE CLINIC | Age: 71
End: 2021-05-31

## 2021-06-06 RX ORDER — LEVOTHYROXINE SODIUM 88 UG/1
TABLET ORAL
Qty: 90 TABLET | Refills: 0 | OUTPATIENT
Start: 2021-06-06

## 2021-06-06 RX ORDER — LEVOTHYROXINE SODIUM 88 UG/1
TABLET ORAL
Qty: 90 TABLET | Refills: 3 | Status: SHIPPED | OUTPATIENT
Start: 2021-06-06 | End: 2021-08-07

## 2021-06-06 NOTE — TELEPHONE ENCOUNTER
Refill request(s) approved--levothryoxine--90-day supply with 3 refill(s). Refill protocol details (computer-generated) reviewed, as available. Labs resulted in [employer] message to pt on 6/1 and read by pt on 6/2.

## 2021-08-07 RX ORDER — LEVOTHYROXINE SODIUM 88 UG/1
TABLET ORAL
Qty: 90 TABLET | Refills: 3 | Status: SHIPPED | OUTPATIENT
Start: 2021-08-07 | End: 2022-07-10

## 2021-08-07 NOTE — TELEPHONE ENCOUNTER
Refill request(s) approved--levothyroxine. Re-sent to mail-order as 90-day supply with 3 refills. Sent to [facility] pharmacy 6-6-21. Refill protocol details (computer-generated) reviewed, as available.

## 2021-10-05 ENCOUNTER — OFFICE VISIT (OUTPATIENT)
Dept: INTERNAL MEDICINE CLINIC | Age: 71
End: 2021-10-05
Payer: MEDICARE

## 2021-10-05 VITALS
DIASTOLIC BLOOD PRESSURE: 66 MMHG | HEART RATE: 75 BPM | SYSTOLIC BLOOD PRESSURE: 133 MMHG | HEIGHT: 64 IN | OXYGEN SATURATION: 96 % | WEIGHT: 134 LBS | RESPIRATION RATE: 14 BRPM | BODY MASS INDEX: 22.88 KG/M2 | TEMPERATURE: 98.4 F

## 2021-10-05 DIAGNOSIS — E03.9 HYPOTHYROIDISM, UNSPECIFIED TYPE: ICD-10-CM

## 2021-10-05 DIAGNOSIS — F43.21 ADJUSTMENT DISORDER WITH DEPRESSED MOOD: Primary | ICD-10-CM

## 2021-10-05 PROCEDURE — 99214 OFFICE O/P EST MOD 30 MIN: CPT | Performed by: INTERNAL MEDICINE

## 2021-10-05 PROCEDURE — G9899 SCRN MAM PERF RSLTS DOC: HCPCS | Performed by: INTERNAL MEDICINE

## 2021-10-05 PROCEDURE — G8536 NO DOC ELDER MAL SCRN: HCPCS | Performed by: INTERNAL MEDICINE

## 2021-10-05 PROCEDURE — G8420 CALC BMI NORM PARAMETERS: HCPCS | Performed by: INTERNAL MEDICINE

## 2021-10-05 PROCEDURE — G8399 PT W/DXA RESULTS DOCUMENT: HCPCS | Performed by: INTERNAL MEDICINE

## 2021-10-05 PROCEDURE — G8427 DOCREV CUR MEDS BY ELIG CLIN: HCPCS | Performed by: INTERNAL MEDICINE

## 2021-10-05 PROCEDURE — G0463 HOSPITAL OUTPT CLINIC VISIT: HCPCS | Performed by: INTERNAL MEDICINE

## 2021-10-05 PROCEDURE — 1090F PRES/ABSN URINE INCON ASSESS: CPT | Performed by: INTERNAL MEDICINE

## 2021-10-05 PROCEDURE — 1101F PT FALLS ASSESS-DOCD LE1/YR: CPT | Performed by: INTERNAL MEDICINE

## 2021-10-05 PROCEDURE — 3017F COLORECTAL CA SCREEN DOC REV: CPT | Performed by: INTERNAL MEDICINE

## 2021-10-05 PROCEDURE — G8510 SCR DEP NEG, NO PLAN REQD: HCPCS | Performed by: INTERNAL MEDICINE

## 2021-10-05 RX ORDER — BISMUTH SUBSALICYLATE 262 MG
1 TABLET,CHEWABLE ORAL DAILY
COMMUNITY

## 2021-10-05 RX ORDER — BUPROPION HYDROCHLORIDE 150 MG/1
150 TABLET ORAL DAILY
Qty: 30 TABLET | Refills: 3 | Status: SHIPPED | OUTPATIENT
Start: 2021-10-05 | End: 2022-05-25

## 2021-10-05 NOTE — PROGRESS NOTES
RM 18    Chief Complaint   Patient presents with    Depression     Patient recently had to put  in long term care community. Patient becomes tearful when speaking about . 1. Have you been to the ER, urgent care clinic since your last visit? Hospitalized since your last visit? No    2. Have you seen or consulted any other health care providers outside of the [address] [place] [address] since your last visit? Include any pap smears or colon screening. No    Health Maintenance Due   Topic Date Due    DTaP/Tdap/Td series (1 - Tdap) Never done    Colorectal Cancer Screening Combo  05/29/2021       Abuse Screening Questionnaire 10/5/2021   Do you ever feel afraid of your partner? N   Are you in a relationship with someone who physically or mentally threatens you? N   Is it safe for you to go home?  Y       3 most recent PHQ Screens 10/5/2021   Little interest or pleasure in doing things Not at all   Feeling down, depressed, irritable, or hopeless Not at all   Total Score PHQ 2 0       Learning Assessment 7/18/2019   PRIMARY LEARNER Patient   BARRIERS PRIMARY LEARNER NONE   PRIMARY LANGUAGE ENGLISH   LEARNER PREFERENCE PRIMARY READING     LISTENING     VIDEOS   ANSWERED BY patient   RELATIONSHIP SELF

## 2021-10-05 NOTE — PROGRESS NOTES
Samantha Solano (: 1950) is a 70 y.o. female, established patient, here for evaluation of the following chief complaint(s):  Chief Complaint   Patient presents with    Depression     Patient recently had to put  in long term care community. Patient becomes tearful when speaking about . Assessment and Plan:       ICD-10-CM ICD-9-CM    1. Adjustment disorder with depressed mood  F43.21 309.0 buPROPion XL (WELLBUTRIN XL) 150 mg tablet   2. Hypothyroidism, unspecified type  E03.9 244.9        1. Medication(s), management and follow-up based on response reviewed at visit. Follow-up on medication, duration of therapy (during adjustment reaction or long-term) reviewed. She anticipates would be able to discontinue at some time. Reviewed alternatives if not effective. 2.  Reviewed not taking medication above with levothyroxine--plan to start HS as reviewed. Follow-up and Dispositions    · Return in about 3 weeks (around 10/26/2021) for medication follow-up--2-4 weeks. reviewed medications and side effects in detail    Plan and evaluation (above) reviewed with pt at visit  Patient voiced understanding of plan and provided with time to ask/review questions. After Visit Summary (AVS) provided to pt after visit with additional instructions as needed/reviewed. Future Appointments   Date Time Provider Kika Rutledge   10/27/2021  8:30 AM Ten Gloria MD [facility]   --Updated future visits after patient check-out. On this date 10/05/2021 I have spent 30 minutes reviewing previous notes, test results and face to face with the patient discussing the diagnosis and importance of compliance with the treatment plan as well as documenting on the day of the visit.       History of Present Illness:     Notes (nursing/rooming note copied below in italics):  As above    3 most recent [facility] 36 Screens 10/5/2021 2021 2020   Little interest or pleasure in doing things Not at all Not at all Not at all   Feeling down, depressed, irritable, or hopeless Not at all Not at all Not at all   Total Score PHQ 2 0 0 0       Here to evaluate above. Her  (also a pt of mine) was admitted to long-term care facility on July     She notes she visits walter, and has to visit in 2 visitation periods daily. He is now wheelchair-bound, due to falls prior. He was admitted, then to rehab but didn't want to parcipate in rehab then. Family decided for him to to to current care facility there. He has acclimated well there despite his memory problems. [facility] location and great facility in their experience. He is in a 15-person cottage--shared open area and individual rooms. They have been able to limit his falls. She will have problems talking about it to people he knows, and frequent crying when talking about him    She notes no other depressed affect/mood. She is \"fine 90% of the time\"    Reviewed medication as above, and counseling if needed. Reviewed dosing, monitoring, duration of therapy and re-eval if needed based on tolerance/SE's. Nursing screenings reviewed by provider at visit. Prior to Admission medications    Medication Sig Start Date End Date Taking? Authorizing Provider   multivitamin (ONE A DAY) tablet Take 1 Tablet by mouth daily. Yes Provider, Historical   levothyroxine (SYNTHROID) 88 mcg tablet TAKE 1 TABLET EVERY DAY  SEVEN  DAYS  A  WEEK 8/7/21  Yes Claudia Ceron MD   naproxen sodium (Aleve) 220 mg cap Take  by mouth. Yes Provider, Historical   Ibuprofen-diphenhydrAMINE (Advil PM) 200-38 mg tab by Mouth/Throat route daily. Yes Provider, Historical   estradiol (ESTRACE) 1 mg tablet Take 1 Tab by mouth daily. 12/28/19  Yes Claudia Ceron MD   VITAMIN D2 50,000 unit capsule TAKE ONE CAPSULE BY MOUTH ONCE A WEEK  Patient taking differently: 2,000 Units daily.  1/13/17  Yes Navya Dawkins MD   GLUCOSAMINE SULFATE (GLUCOSAMINE PO) Take 2 Tabs by mouth daily. Yes Provider, Historical        ROS    Vitals:    10/05/21 1030 10/05/21 1117   BP: (!) 157/74 133/66   Pulse: 75    Resp: 14    Temp: 98.4 °F (36.9 °C)    TempSrc: Oral    SpO2: 96%    Weight: 134 lb (60.8 kg)    Height: 5' 4\" (1.626 m)    PainSc:   0 - No pain       Body mass index is 23 kg/m². Physical Exam:     Physical Exam  Vitals and nursing note reviewed. Constitutional:       General: She is not in acute distress. Appearance: Normal appearance. She is well-developed. She is not diaphoretic. HENT:      Head: Normocephalic and atraumatic. Mouth/Throat:      Mouth: Mucous membranes are moist.   Eyes:      General: No scleral icterus. Right eye: No discharge. Left eye: No discharge. Conjunctiva/sclera: Conjunctivae normal.   Cardiovascular:      Rate and Rhythm: Normal rate and regular rhythm. Pulses: Normal pulses. Heart sounds: Normal heart sounds. No murmur heard. No friction rub. No gallop. Pulmonary:      Effort: Pulmonary effort is normal. No respiratory distress. Breath sounds: Normal breath sounds. No stridor. No wheezing, rhonchi or rales. Abdominal:      General: Bowel sounds are normal. There is no distension. Palpations: Abdomen is soft. Tenderness: There is no abdominal tenderness. There is no guarding. Musculoskeletal:         General: No swelling, tenderness, deformity or signs of injury. Skin:     General: Skin is warm. Coloration: Skin is not jaundiced or pale. Findings: No bruising, erythema or rash. Neurological:      General: No focal deficit present. Mental Status: She is alert. Motor: No abnormal muscle tone. Coordination: Coordination normal.      Gait: Gait normal.   Psychiatric:         Behavior: Behavior normal.         Thought Content:  Thought content normal.         Judgment: Judgment normal.      Comments: Frequently cries during visit when talking about her . She is able to compose herself, and continue visit. An electronic signature was used to authenticate this note.   -- Pramod Mosqueda MD

## 2022-03-25 ENCOUNTER — TELEPHONE (OUTPATIENT)
Dept: INTERNAL MEDICINE CLINIC | Age: 72
End: 2022-03-25

## 2022-03-25 NOTE — TELEPHONE ENCOUNTER
----- Message from Lashae Brown. Randiteena Jones sent at 3/22/2022  6:54 PM EDT -----  Regarding: Prescription change  Hospice was called in about 12 days ago to help care for [facility]. As much as I didn't think I \"needed\" anything to get thru this time,  I did start the meds you prescribed. I  am so emotional about it all. Is there anything else I can take to help get thru this time. Thank you. Kathleen Flannery

## 2022-03-28 ENCOUNTER — PATIENT MESSAGE (OUTPATIENT)
Dept: INTERNAL MEDICINE CLINIC | Age: 72
End: 2022-03-28

## 2022-03-29 NOTE — TELEPHONE ENCOUNTER
From: Tim Fred  To: Heidy Conley MD  Sent: 3/28/2022 9:20 AM EDT  Subject: Emotion meds    I sent a message last week re: getting an emotion stabilizer (?) for the situation I am going thru w/Christian right now. Hospice thinks he is transitioning and my emotions are everywhere. The Hospice nurse suggested I ask for script for Laquita Ibrahim to help. The med you gave me doesn't seem to be working very well. ... Billie Deng Thank you  Hiram Andrews

## 2022-03-29 NOTE — TELEPHONE ENCOUNTER
Future Appointments:  Future Appointments   Date Time Provider Kika Rutledge   3/31/2022 12:00 PM Bret Mullen MD [facility]   5/25/2022  8:00 AM Bret Mullen MD [facility]        Last Appointment With Me:  10/5/2021     Requested Prescriptions      No prescriptions requested or ordered in this encounter     Spoke with patient she requested above appt to discuss current anxiety/emotional stressors.

## 2022-03-31 ENCOUNTER — OFFICE VISIT (OUTPATIENT)
Dept: INTERNAL MEDICINE CLINIC | Age: 72
End: 2022-03-31
Payer: MEDICARE

## 2022-03-31 VITALS
RESPIRATION RATE: 18 BRPM | HEIGHT: 64 IN | WEIGHT: 129 LBS | TEMPERATURE: 98.1 F | HEART RATE: 91 BPM | BODY MASS INDEX: 22.02 KG/M2 | DIASTOLIC BLOOD PRESSURE: 88 MMHG | SYSTOLIC BLOOD PRESSURE: 137 MMHG | OXYGEN SATURATION: 99 %

## 2022-03-31 DIAGNOSIS — F43.23 ADJUSTMENT DISORDER WITH MIXED ANXIETY AND DEPRESSED MOOD: Primary | ICD-10-CM

## 2022-03-31 PROCEDURE — 1101F PT FALLS ASSESS-DOCD LE1/YR: CPT | Performed by: INTERNAL MEDICINE

## 2022-03-31 PROCEDURE — 1090F PRES/ABSN URINE INCON ASSESS: CPT | Performed by: INTERNAL MEDICINE

## 2022-03-31 PROCEDURE — G8399 PT W/DXA RESULTS DOCUMENT: HCPCS | Performed by: INTERNAL MEDICINE

## 2022-03-31 PROCEDURE — G8510 SCR DEP NEG, NO PLAN REQD: HCPCS | Performed by: INTERNAL MEDICINE

## 2022-03-31 PROCEDURE — G8536 NO DOC ELDER MAL SCRN: HCPCS | Performed by: INTERNAL MEDICINE

## 2022-03-31 PROCEDURE — G8427 DOCREV CUR MEDS BY ELIG CLIN: HCPCS | Performed by: INTERNAL MEDICINE

## 2022-03-31 PROCEDURE — G8420 CALC BMI NORM PARAMETERS: HCPCS | Performed by: INTERNAL MEDICINE

## 2022-03-31 PROCEDURE — G0463 HOSPITAL OUTPT CLINIC VISIT: HCPCS | Performed by: INTERNAL MEDICINE

## 2022-03-31 PROCEDURE — 3017F COLORECTAL CA SCREEN DOC REV: CPT | Performed by: INTERNAL MEDICINE

## 2022-03-31 PROCEDURE — 99213 OFFICE O/P EST LOW 20 MIN: CPT | Performed by: INTERNAL MEDICINE

## 2022-03-31 RX ORDER — LORAZEPAM 0.5 MG/1
0.5 TABLET ORAL
Qty: 50 TABLET | Refills: 1 | Status: SHIPPED | OUTPATIENT
Start: 2022-03-31 | End: 2022-05-25

## 2022-03-31 NOTE — PROGRESS NOTES
rm 18    Chief Complaint   Patient presents with    Anxiety     declining health of loved one     1. Have you been to the ER, urgent care clinic since your last visit? Hospitalized since your last visit? No    2. Have you seen or consulted any other health care providers outside of the [address] [place] [address] since your last visit? Include any pap smears or colon screening.  No     Visit Vitals  /88 (BP 1 Location: Left arm, BP Patient Position: Sitting, BP Cuff Size: Adult)   Pulse 91   Temp 98.1 °F (36.7 °C) (Oral)   Resp 18   Ht 5' 4\" (1.626 m)   Wt 129 lb (58.5 kg)   SpO2 99%   BMI 22.14 kg/m²

## 2022-05-03 ENCOUNTER — OFFICE VISIT (OUTPATIENT)
Dept: INTERNAL MEDICINE CLINIC | Age: 72
End: 2022-05-03

## 2022-05-03 VITALS
HEART RATE: 65 BPM | BODY MASS INDEX: 22.53 KG/M2 | RESPIRATION RATE: 16 BRPM | TEMPERATURE: 97.8 F | DIASTOLIC BLOOD PRESSURE: 69 MMHG | SYSTOLIC BLOOD PRESSURE: 126 MMHG | WEIGHT: 132 LBS | HEIGHT: 64 IN | OXYGEN SATURATION: 96 %

## 2022-05-03 DIAGNOSIS — H61.23 CERUMINOSIS, BILATERAL: Primary | ICD-10-CM

## 2022-05-03 DIAGNOSIS — F43.21 ADJUSTMENT DISORDER WITH DEPRESSED MOOD: ICD-10-CM

## 2022-05-03 NOTE — PROGRESS NOTES
Blood pressure 126/69, pulse 65, temperature 97.8 °F (36.6 °C), temperature source Oral, resp. rate 16, height 5' 4\" (1.626 m), weight 132 lb (59.9 kg), SpO2 96 %. Ear wax reviewed and irrigated with nursing at visit. Right TM with mild injection. Left TM with no injection/erythema. Both TM's/ears examined after nurse irrigation. She notes did well with use of lorazepam during her 's hospice time and .    She only used 2 doses. She notes an episode of amnesia thought to be possibly med-related, but only from time after  through receiving friends at a reception afterward. Just after this she returned home, and has full memory of events after that. She had reviewed with pt's hospice nurse who reassured this was normal and that memories may come back. She had this only for time above. Not using/needing medication now. She was tearful in talking about him with nursing end of visit only.     Future Appointments   Date Time Provider Kika Rutledge   2022  8:00 AM Humza Porter MD [facility]

## 2022-05-03 NOTE — PROGRESS NOTES
rm 17    Ears feel full  Dizziness this morning  Hx of ear lavages    Chief Complaint   Patient presents with    Ear Fullness     x 3 days     1. Have you been to the ER, urgent care clinic since your last visit? Hospitalized since your last visit? No    2. Have you seen or consulted any other health care providers outside of the [address] [place] [address] since your last visit? Include any pap smears or colon screening.  No     Visit Vitals  /69 (BP 1 Location: Left arm, BP Patient Position: Sitting, BP Cuff Size: Adult)   Pulse 65   Temp 97.8 °F (36.6 °C) (Oral)   Resp 16   Ht 5' 4\" (1.626 m)   Wt 132 lb (59.9 kg)   SpO2 96%   BMI 22.66 kg/m²

## 2022-05-18 ENCOUNTER — PATIENT MESSAGE (OUTPATIENT)
Dept: INTERNAL MEDICINE CLINIC | Age: 72
End: 2022-05-18

## 2022-05-20 ENCOUNTER — LAB ONLY (OUTPATIENT)
Dept: INTERNAL MEDICINE CLINIC | Age: 72
End: 2022-05-20

## 2022-05-20 DIAGNOSIS — M85.80 OSTEOPENIA, UNSPECIFIED LOCATION: ICD-10-CM

## 2022-05-20 DIAGNOSIS — E03.9 HYPOTHYROIDISM, UNSPECIFIED TYPE: ICD-10-CM

## 2022-05-20 DIAGNOSIS — E55.9 VITAMIN D DEFICIENCY: ICD-10-CM

## 2022-05-20 DIAGNOSIS — Z00.00 WELL ADULT HEALTH CHECK: Primary | ICD-10-CM

## 2022-05-20 NOTE — PROGRESS NOTES
Lab orders placed for visit today. Diagnoses and all orders for this visit:    1. Well adult health check  -     CBC WITH AUTOMATED DIFF; Future  -     METABOLIC PANEL, COMPREHENSIVE; Future  -     LIPID PANEL; Future  -     HEMOGLOBIN A1C WITH EAG; Future    2. Hypothyroidism, unspecified type  -     TSH 3RD GENERATION; Future  -     T4, FREE; Future    3. Osteopenia, unspecified location  -     VITAMIN D, 25 HYDROXY; Future    4.  Vitamin D deficiency  -     VITAMIN D, 25 HYDROXY; Future          Future Appointments   Date Time Provider Kika Rutledge   5/20/2022 12:00 PM LAB [facility]   5/25/2022  8:00 AM Claude Serrano MD [facility]

## 2022-05-21 LAB
25(OH)D3 SERPL-MCNC: 45.9 NG/ML (ref 30–100)
ALBUMIN SERPL-MCNC: 3.9 G/DL (ref 3.5–5)
ALBUMIN/GLOB SERPL: 1.3 {RATIO} (ref 1.1–2.2)
ALP SERPL-CCNC: 49 U/L (ref 45–117)
ALT SERPL-CCNC: 16 U/L (ref 12–78)
ANION GAP SERPL CALC-SCNC: 6 MMOL/L (ref 5–15)
AST SERPL-CCNC: 8 U/L (ref 15–37)
BASOPHILS # BLD: 0 K/UL (ref 0–0.1)
BASOPHILS NFR BLD: 1 % (ref 0–1)
BILIRUB SERPL-MCNC: 0.6 MG/DL (ref 0.2–1)
BUN SERPL-MCNC: 23 MG/DL (ref 6–20)
BUN/CREAT SERPL: 29 (ref 12–20)
CALCIUM SERPL-MCNC: 9.2 MG/DL (ref 8.5–10.1)
CHLORIDE SERPL-SCNC: 103 MMOL/L (ref 97–108)
CHOLEST SERPL-MCNC: 207 MG/DL
CO2 SERPL-SCNC: 28 MMOL/L (ref 21–32)
CREAT SERPL-MCNC: 0.78 MG/DL (ref 0.55–1.02)
DIFFERENTIAL METHOD BLD: ABNORMAL
EOSINOPHIL # BLD: 0.1 K/UL (ref 0–0.4)
EOSINOPHIL NFR BLD: 1 % (ref 0–7)
ERYTHROCYTE [DISTWIDTH] IN BLOOD BY AUTOMATED COUNT: 13.6 % (ref 11.5–14.5)
EST. AVERAGE GLUCOSE BLD GHB EST-MCNC: 111 MG/DL
GLOBULIN SER CALC-MCNC: 3.1 G/DL (ref 2–4)
GLUCOSE SERPL-MCNC: 96 MG/DL (ref 65–100)
HBA1C MFR BLD: 5.5 % (ref 4–5.6)
HCT VFR BLD AUTO: 40.6 % (ref 35–47)
HDLC SERPL-MCNC: 75 MG/DL
HDLC SERPL: 2.8 {RATIO} (ref 0–5)
HGB BLD-MCNC: 12.6 G/DL (ref 11.5–16)
IMM GRANULOCYTES # BLD AUTO: 0 K/UL (ref 0–0.04)
IMM GRANULOCYTES NFR BLD AUTO: 0 % (ref 0–0.5)
LDLC SERPL CALC-MCNC: 106 MG/DL (ref 0–100)
LYMPHOCYTES # BLD: 1.6 K/UL (ref 0.8–3.5)
LYMPHOCYTES NFR BLD: 21 % (ref 12–49)
MCH RBC QN AUTO: 31 PG (ref 26–34)
MCHC RBC AUTO-ENTMCNC: 31 G/DL (ref 30–36.5)
MCV RBC AUTO: 100 FL (ref 80–99)
MONOCYTES # BLD: 0.5 K/UL (ref 0–1)
MONOCYTES NFR BLD: 6 % (ref 5–13)
NEUTS SEG # BLD: 5.4 K/UL (ref 1.8–8)
NEUTS SEG NFR BLD: 71 % (ref 32–75)
NRBC # BLD: 0 K/UL (ref 0–0.01)
NRBC BLD-RTO: 0 PER 100 WBC
PLATELET # BLD AUTO: 238 K/UL (ref 150–400)
PMV BLD AUTO: 9.3 FL (ref 8.9–12.9)
POTASSIUM SERPL-SCNC: 4.1 MMOL/L (ref 3.5–5.1)
PROT SERPL-MCNC: 7 G/DL (ref 6.4–8.2)
RBC # BLD AUTO: 4.06 M/UL (ref 3.8–5.2)
SODIUM SERPL-SCNC: 137 MMOL/L (ref 136–145)
T4 FREE SERPL-MCNC: 1.2 NG/DL (ref 0.8–1.5)
TRIGL SERPL-MCNC: 130 MG/DL (ref ?–150)
TSH SERPL DL<=0.05 MIU/L-ACNC: 2.32 UIU/ML (ref 0.36–3.74)
VLDLC SERPL CALC-MCNC: 26 MG/DL
WBC # BLD AUTO: 7.6 K/UL (ref 3.6–11)

## 2022-05-25 ENCOUNTER — OFFICE VISIT (OUTPATIENT)
Dept: INTERNAL MEDICINE CLINIC | Age: 72
End: 2022-05-25
Payer: MEDICARE

## 2022-05-25 VITALS
BODY MASS INDEX: 22.2 KG/M2 | OXYGEN SATURATION: 100 % | DIASTOLIC BLOOD PRESSURE: 69 MMHG | RESPIRATION RATE: 16 BRPM | HEART RATE: 73 BPM | SYSTOLIC BLOOD PRESSURE: 136 MMHG | HEIGHT: 64 IN | WEIGHT: 130 LBS | TEMPERATURE: 97.5 F

## 2022-05-25 DIAGNOSIS — Z13.31 SCREENING FOR DEPRESSION: ICD-10-CM

## 2022-05-25 DIAGNOSIS — Z00.00 MEDICARE ANNUAL WELLNESS VISIT, SUBSEQUENT: Primary | ICD-10-CM

## 2022-05-25 DIAGNOSIS — E03.9 HYPOTHYROIDISM, UNSPECIFIED TYPE: ICD-10-CM

## 2022-05-25 DIAGNOSIS — Z12.11 SCREENING FOR COLON CANCER: ICD-10-CM

## 2022-05-25 PROCEDURE — G8427 DOCREV CUR MEDS BY ELIG CLIN: HCPCS | Performed by: INTERNAL MEDICINE

## 2022-05-25 PROCEDURE — 3017F COLORECTAL CA SCREEN DOC REV: CPT | Performed by: INTERNAL MEDICINE

## 2022-05-25 PROCEDURE — G8399 PT W/DXA RESULTS DOCUMENT: HCPCS | Performed by: INTERNAL MEDICINE

## 2022-05-25 PROCEDURE — 1101F PT FALLS ASSESS-DOCD LE1/YR: CPT | Performed by: INTERNAL MEDICINE

## 2022-05-25 PROCEDURE — G8510 SCR DEP NEG, NO PLAN REQD: HCPCS | Performed by: INTERNAL MEDICINE

## 2022-05-25 PROCEDURE — G8420 CALC BMI NORM PARAMETERS: HCPCS | Performed by: INTERNAL MEDICINE

## 2022-05-25 PROCEDURE — G8536 NO DOC ELDER MAL SCRN: HCPCS | Performed by: INTERNAL MEDICINE

## 2022-05-25 PROCEDURE — 1123F ACP DISCUSS/DSCN MKR DOCD: CPT | Performed by: INTERNAL MEDICINE

## 2022-05-25 PROCEDURE — G0439 PPPS, SUBSEQ VISIT: HCPCS | Performed by: INTERNAL MEDICINE

## 2022-05-25 RX ORDER — LANOLIN ALCOHOL/MO/W.PET/CERES
1000 CREAM (GRAM) TOPICAL DAILY
COMMUNITY

## 2022-05-25 NOTE — PATIENT INSTRUCTIONS

## 2022-05-25 NOTE — PROGRESS NOTES
Gayle Medrano (: 1950) is a 70 y.o. female, established patient, here for evaluation of the following chief complaint(s):  see below. This is a Subsequent Medicare Annual Wellness Exam (AWV) (Performed 12 months after IPPE or effective date of Medicare Part B enrollment)    I have reviewed the patient's medical history in detail and updated the computerized patient record. Assessment/Plan       ICD-10-CM ICD-9-CM    1. Medicare annual wellness visit, subsequent  Z00.00 V70.0    2. Screening for depression  Z13.31 V79.0    3. Screening for colon cancer  Z12.11 V76.51 OCCULT BLOOD IMMUNOASSAY,DIAGNOSTIC   4. Hypothyroidism, unspecified type  E03.9 244.9        1-3:  Screenings reviewed at visit. 3.  Repeat FIT testing reviewed. She had questions about Cologuard, and will review further in future if prefers. She is not interested in colonoscopy. 4.  Reviewed and will request mail-order refill when needed. Not needed at this time. Continue yearly follow-up with labs prior to AWV. Follow-up and Dispositions    · Return in about 1 year (around 2023) for Medicare Wellness exam/visit, fasting labs. lab results and schedule of future lab studies reviewed with patient  reviewed medications and side effects in detail    Plan and evaluation (above) reviewed with pt at visit  Patient voiced understanding of plan and provided with time to ask/review questions. After Visit Summary (AVS) provided to pt after visit with additional instructions as needed/reviewed. Future Appointments   Date Time Provider Kika Rutledge   2023  8:00 AM Olga Ibarra MD [facility]   --Updated future visits after patient check-out. History     Chief Complaint   Patient presents with   [employer] Annual Wellness Visit       Notes (nursing/rooming note):  As above. Notes:  Labs done 22 and reviewed at visit.     She notes still has no memory related to lorazepam use during visitation after  funer    She has just started taking a B12 supplement. No prior testing or deficiency noted. She is primarily drinking a protein supplement for meals. She is just now hungry. This is not related to her 's death. Messaged nutrition after visit to be sure no nutritional concerns associated with this diet. She used to walk the dog more. Protein shake has helped with muscle tone in legs also. She will have some dizziness with position changes. Has started doing yoga and some bar exercises. Nursing screenings reviewed by provider at visit. Past Medical History:   Diagnosis Date    Abnormal EKG Jan 2016    Possible anterior infarct--Ortho pre-op. New finding compared to prior EKG.  Encounter for screening colonoscopy 2/3/12    Dr Sellers-hemorrhoids and diverticulosis-repeat 5 years    GERD (gastroesophageal reflux disease)     Hypothyroidism     Kidney infection age 21    Osteopenia     at femoral neck T-score -1.4 in 2/10    Unspecified vitamin D deficiency     Varicella vaccine August 2011      Past Surgical History:   Procedure Laterality Date    HX GYN  1990    Hysterectomy    HX ORTHOPAEDIC  Feb 2014    Left shoulder arthroscopy and manipulation with lysis    HX ORTHOPAEDIC Right Jan 11, 2016    Small finger PIP join tfusion surgery (fracture)     HX TOTAL ABDOMINAL HYSTERECTOMY      LA ABDOMEN SURGERY PROC UNLISTED  2007    lap nissen fundoplication    LA APPENDECTOMY       Prior to Admission medications    Medication Sig Start Date End Date Taking? Authorizing Provider   LORazepam (ATIVAN) 0.5 mg tablet Take 1 Tablet by mouth every six (6) hours as needed for Anxiety. Max Daily Amount: 6 mg. May increase to 1mg (2 tabs) or 1.5mg (3 tabs) as reviewed/directed. Patient not taking: Reported on 5/3/2022 3/31/22   Javid Vasques MD   multivitamin (ONE A DAY) tablet Take 1 Tablet by mouth daily.     Provider, Historical   buPROPion XL [facility] XL) 150 mg tablet Take 1 Tablet by mouth daily. Patient not taking: Reported on 5/3/2022 10/5/21   Ernesto Stacy MD   levothyroxine (SYNTHROID) 88 mcg tablet TAKE 1 TABLET EVERY DAY  SEVEN  DAYS  A  WEEK 8/7/21   Ernesto Stacy MD   naproxen sodium (Aleve) 220 mg cap Take  by mouth. Provider, Historical   Ibuprofen-diphenhydrAMINE (Advil PM) 200-38 mg tab by Mouth/Throat route daily. Provider, Historical   estradiol (ESTRACE) 1 mg tablet Take 1 Tab by mouth daily. 12/28/19   Ernesto Stacy MD   VITAMIN D2 50,000 unit capsule TAKE ONE CAPSULE BY MOUTH ONCE A WEEK 1/13/17   Erica Smith MD   GLUCOSAMINE SULFATE (GLUCOSAMINE PO) Take 2 Tabs by mouth daily. Provider, Historical       No Known Allergies  Family History   Problem Relation Age of Onset    Osteoporosis Mother         hip fracture    Cancer Brother     Heart Disease Brother     Diabetes Maternal Aunt     Breast Cancer Maternal Aunt     Thyroid Disease Neg Hx      Social History     Tobacco Use    Smoking status: Never Smoker    Smokeless tobacco: Never Used   Substance Use Topics    Alcohol use:  Yes     Alcohol/week: 4.2 standard drinks     Types: 5 Glasses of wine per week     Patient Active Problem List   Diagnosis Code    Pure hypercholesterolemia E78.00    Osteopenia M85.80    GERD (gastroesophageal reflux disease) K21.9    Diverticulosis K57.90    Vitamin D deficiency E55.9    Hypothyroidism E03.9    Unspecified vitamin D deficiency E55.9    Anemia D64.9       Depression Risk Factor Screening:     3 most recent PHQ Screens 5/25/2022   Little interest or pleasure in doing things Not at all   Feeling down, depressed, irritable, or hopeless Several days   Total Score PHQ 2 1       Alcohol Risk Factor Screening:   Do you average more than 1 drink per night or more than 7 drinks a week:  No  On any one occasion in the past three months have you have had more than 3 drinks containing alcohol: No  Glass of wine daily. Functional Ability and Level of Safety:     Hearing Loss  Hearing is good. Activities of Daily Living  The home contains: grab bars  See below:    ADL Assessment 5/25/2022   Feeding yourself No Help Needed   Getting from bed to chair No Help Needed   Getting dressed No Help Needed   Bathing or showering No Help Needed   Walk across the room (includes cane/walker) No Help Needed   Using the telphone No Help Needed   Taking your medications No Help Needed   Preparing meals No Help Needed   Managing money (expenses/bills) No Help Needed   Moderately strenuous housework (laundry) No Help Needed   Shopping for personal items (toiletries/medicines) No Help Needed   Shopping for groceries No Help Needed   Driving No Help Needed   Climbing a flight of stairs No Help Needed   Getting to places beyond walking distances No Help Needed       Fall Risk  Fall Risk Assessment, last 12 mths 5/25/2022   Able to walk? Yes   Fall in past 12 months? 0   Do you feel unsteady? 0   Are you worried about falling 0       Abuse Screen  Patient is not abused    Abuse Screening Questionnaire 5/16/2022   Do you ever feel afraid of your partner? N   Are you in a relationship with someone who physically or mentally threatens you? N   Is it safe for you to go home? Y       Cognitive Screening   Evaluation of Cognitive Function:  Has your family/caregiver stated any concerns about your memory: no      Physical Exam[de-identified]     Height 5' 4\" (1.626 m), weight 130 lb (59 kg). Vitals:    05/25/22 0809   BP: 136/69   BP 1 Location: Left upper arm   BP Patient Position: Sitting   BP Cuff Size: Adult   Pulse: 73   Temp: 97.5 °F (36.4 °C)   TempSrc: Oral   Resp: 16   Height: 5' 4\" (1.626 m)   Weight: 130 lb (59 kg)   SpO2: 100%       General appearance: alert, cooperative, no distress, appears stated age  Head: Normocephalic, without obvious abnormality, atraumatic  Eyes: conjunctivae/corneas clear.   Ears: normal TM's and external ear canals AU. No wax bilat--reviewed with pt. Neck: supple, symmetrical, trachea midline, no adenopathy, thyroid: not enlarged, symmetric, no tenderness/mass/nodules  Back: symmetric, no curvature. ROM normal  Lungs: clear to auscultation bilaterally  Chest wall: no tenderness  Heart: regular rate and rhythm, S1, S2 normal, no murmur, click, rub or gallop  Abdomen: soft, non-tender. Bowel sounds normal. No masses,  no organomegaly  Extremities: extremities normal, atraumatic, no cyanosis or edema  Pulses: 2+ and symmetric radial.  Skin: Skin color, texture, turgor normal. No rashes or lesions  Lymph nodes: Cervical nodes normal.  Neurologic: Grossly normal    Patient Care Team   Patient Care Team:  Carlos Delgado MD as PCP - General (Internal Medicine Physician)  Carlos Delgado MD as PCP - [facility][1] Empaneled Provider    Advice/Referrals/Counseling   Education and counseling provided:  Are appropriate based on today's review and evaluation    Health Maintenance Due   Topic Date Due    DTaP/Tdap/Td series (1 - Tdap) Never done    Colorectal Cancer Screening Combo  05/29/2021    Breast Cancer Screen Mammogram  11/13/2021    Medicare Yearly Exam  05/25/2022     --FIT test reviewed--prefers to repeat today. Kit provided at visit. --Tdap reviewed. She will get through pharmacy. --She had mammogram with gyn recently. Release completed for record at visit. An electronic signature was used to authenticate this note.   -- Russ Jett MD

## 2022-05-25 NOTE — PROGRESS NOTES
Room 16     Identified pt with two pt identifiers(name and ). Reviewed record in preparation for visit and have obtained necessary documentation. All patient medications has been reviewed. Chief Complaint   Patient presents with   [facility] Annual Wellness Visit       3 most recent PHQ Screens 2022   Little interest or pleasure in doing things Not at all   Feeling down, depressed, irritable, or hopeless Several days   Total Score PHQ 2 1     Abuse Screening Questionnaire 2022   Do you ever feel afraid of your partner? N   Are you in a relationship with someone who physically or mentally threatens you? N   Is it safe for you to go home? Y       Health Maintenance Due   Topic    DTaP/Tdap/Td series (1 - Tdap)    Colorectal Cancer Screening Combo     Breast Cancer Screen Mammogram     Medicare Yearly Exam      ADL Assessment 2022   Feeding yourself No Help Needed   Getting from bed to chair No Help Needed   Getting dressed No Help Needed   Bathing or showering No Help Needed   Walk across the room (includes cane/walker) No Help Needed   Using the telphone No Help Needed   Taking your medications No Help Needed   Preparing meals No Help Needed   Managing money (expenses/bills) No Help Needed   Moderately strenuous housework (laundry) No Help Needed   Shopping for personal items (toiletries/medicines) No Help Needed   Shopping for groceries No Help Needed   Driving No Help Needed   Climbing a flight of stairs No Help Needed   Getting to places beyond walking distances No Help Needed         Health Maintenance Review: Patient reminded of \"due or due soon\" health maintenance. I have asked the patient to contact his/her primary care provider (PCP) for follow-up on his/her health maintenance.     Vitals:    22 0809   BP: 136/69   Pulse: 73   Resp: 16   Temp: 97.5 °F (36.4 °C)   TempSrc: Oral   SpO2: 100%   Weight: 130 lb (59 kg)   Height: 5' 4\" (1.626 m)   PainSc:   0 - No pain       Wt Readings from Last 3 Encounters:   05/25/22 130 lb (59 kg)   05/03/22 132 lb (59.9 kg)   03/31/22 129 lb (58.5 kg)     Temp Readings from Last 3 Encounters:   05/25/22 97.5 °F (36.4 °C) (Oral)   05/03/22 97.8 °F (36.6 °C) (Oral)   03/31/22 98.1 °F (36.7 °C) (Oral)     BP Readings from Last 3 Encounters:   05/25/22 136/69   05/03/22 126/69   03/31/22 137/88     Pulse Readings from Last 3 Encounters:   05/25/22 73   05/03/22 65   03/31/22 91       Coordination of Care Questionnaire:   1) Have you been to an emergency room, urgent care, or hospitalized since your last visit?   no       2. Have seen or consulted any other health care provider since your last visit? NO    Patient is accompanied by self I have received verbal consent from Dea Villafuerte to discuss any/all medical information while they are present in the room.

## 2022-07-10 RX ORDER — LEVOTHYROXINE SODIUM 88 UG/1
TABLET ORAL
Qty: 90 TABLET | Refills: 3 | Status: SHIPPED | OUTPATIENT
Start: 2022-07-10

## 2022-07-10 NOTE — TELEPHONE ENCOUNTER
Refill request(s) approved--levothyroxine--90-day supply with 3 refill(s)--as requested. Refill protocol details (computer-generated) reviewed, as available.     Lab Results   Component Value Date/Time    TSH 2.32 05/20/2022 11:59 AM    T4, Free 1.2 05/20/2022 11:59 AM

## 2022-08-08 ENCOUNTER — NURSE TRIAGE (OUTPATIENT)
Dept: OTHER | Facility: CLINIC | Age: 72
End: 2022-08-08

## 2022-08-08 NOTE — TELEPHONE ENCOUNTER
Received call from [place] at [facility] with Red Flag Complaint. Subjective: Caller states \" On Thursday was having Vertigo. I was in bed I got up to use the bathroom and got dizzy and horrible and nothing had anything like it before. I laid down and it went away and felt better. It hasn't came back since, but I didn't have anything like this before and was scary. A month ago in bed I did feel my heart palpitations but went away and has never came back; so not sure if that has anything to do with it. \"     Current Symptoms: Not at this time, but had Dizziness; stiffness in neck    Onset: 3 days ago; improving    Associated Symptoms: NA    Pain Severity: 0/10; N/A; None    Temperature:  Denies Fever    What has been tried: Laid back down when it happened. LMP: NA Pregnant: NA    Recommended disposition: See PCP within 3 Days    Care advice provided, patient verbalizes understanding; denies any other questions or concerns; instructed to call back for any new or worsening symptoms. Patient/Caller agrees with recommended disposition; writer provided warm transfer to [employer] at [facility] for appointment scheduling     Attention Provider: Thank you for allowing me to participate in the care of your patient. The patient was connected to triage in response to information provided to the [facility]. Please do not respond through this encounter as the response is not directed to a shared pool.         Reason for Disposition   MILD dizziness (e.g., walking normally) AND has NOT been evaluated by physician for this (Exception: dizziness caused by heat exposure, sudden standing, or poor fluid intake)    Protocols used: Dizziness-ADULT-[place]

## 2022-08-09 ENCOUNTER — OFFICE VISIT (OUTPATIENT)
Dept: INTERNAL MEDICINE CLINIC | Age: 72
End: 2022-08-09
Payer: MEDICARE

## 2022-08-09 VITALS
OXYGEN SATURATION: 96 % | BODY MASS INDEX: 22.33 KG/M2 | RESPIRATION RATE: 18 BRPM | HEIGHT: 64 IN | HEART RATE: 79 BPM | WEIGHT: 130.8 LBS | DIASTOLIC BLOOD PRESSURE: 80 MMHG | TEMPERATURE: 97.5 F | SYSTOLIC BLOOD PRESSURE: 130 MMHG

## 2022-08-09 DIAGNOSIS — R42 ORTHOSTATIC DIZZINESS: ICD-10-CM

## 2022-08-09 DIAGNOSIS — R42 DIZZINESS: Primary | ICD-10-CM

## 2022-08-09 LAB
BILIRUB UR QL STRIP: NEGATIVE
GLUCOSE UR-MCNC: NEGATIVE MG/DL
KETONES P FAST UR STRIP-MCNC: NEGATIVE MG/DL
PH UR STRIP: 6 [PH] (ref 4.6–8)
PROT UR QL STRIP: NEGATIVE
SP GR UR STRIP: 1.03 (ref 1–1.03)
UA UROBILINOGEN AMB POC: NORMAL (ref 0.2–1)
URINALYSIS CLARITY POC: CLEAR
URINALYSIS COLOR POC: YELLOW
URINE BLOOD POC: NEGATIVE
URINE LEUKOCYTES POC: NEGATIVE
URINE NITRITES POC: NEGATIVE

## 2022-08-09 PROCEDURE — 1090F PRES/ABSN URINE INCON ASSESS: CPT | Performed by: NURSE PRACTITIONER

## 2022-08-09 PROCEDURE — G8427 DOCREV CUR MEDS BY ELIG CLIN: HCPCS | Performed by: NURSE PRACTITIONER

## 2022-08-09 PROCEDURE — G8399 PT W/DXA RESULTS DOCUMENT: HCPCS | Performed by: NURSE PRACTITIONER

## 2022-08-09 PROCEDURE — G8536 NO DOC ELDER MAL SCRN: HCPCS | Performed by: NURSE PRACTITIONER

## 2022-08-09 PROCEDURE — 1101F PT FALLS ASSESS-DOCD LE1/YR: CPT | Performed by: NURSE PRACTITIONER

## 2022-08-09 PROCEDURE — G0463 HOSPITAL OUTPT CLINIC VISIT: HCPCS | Performed by: NURSE PRACTITIONER

## 2022-08-09 PROCEDURE — 1123F ACP DISCUSS/DSCN MKR DOCD: CPT | Performed by: NURSE PRACTITIONER

## 2022-08-09 PROCEDURE — 3017F COLORECTAL CA SCREEN DOC REV: CPT | Performed by: NURSE PRACTITIONER

## 2022-08-09 PROCEDURE — 99214 OFFICE O/P EST MOD 30 MIN: CPT | Performed by: NURSE PRACTITIONER

## 2022-08-09 PROCEDURE — G8420 CALC BMI NORM PARAMETERS: HCPCS | Performed by: NURSE PRACTITIONER

## 2022-08-09 PROCEDURE — 81002 URINALYSIS NONAUTO W/O SCOPE: CPT | Performed by: NURSE PRACTITIONER

## 2022-08-09 PROCEDURE — G8432 DEP SCR NOT DOC, RNG: HCPCS | Performed by: NURSE PRACTITIONER

## 2022-08-09 NOTE — PROGRESS NOTES
Subjective  Gwendolyn Kwan is a 67 y.o. female presents for acute care   HPI    Past Medical History:   Diagnosis Date    Abnormal EKG Jan 2016    Possible anterior infarct--Ortho pre-op. New finding compared to prior EKG. Encounter for screening colonoscopy 2/3/12    Dr Tom Tong and diverticulosis-repeat 5 years    GERD (gastroesophageal reflux disease)     Hypothyroidism     Kidney infection age 21    Osteopenia     at femoral neck T-score -1.4 in 2/10    Unspecified vitamin D deficiency     Varicella vaccine August 2011        No Known Allergies     Current Outpatient Medications   Medication Sig    levothyroxine (SYNTHROID) 88 mcg tablet TAKE 1 TABLET EVERY DAY  SEVEN  DAYS  A  WEEK    cyanocobalamin 1,000 mcg tablet Take 1,000 mcg by mouth daily. multivitamin (ONE A DAY) tablet Take 1 Tablet by mouth daily. naproxen sodium 220 mg cap Take  by mouth.    estradiol (ESTRACE) 1 mg tablet Take 1 Tab by mouth daily. GLUCOSAMINE SULFATE (GLUCOSAMINE PO) Take 2 Tabs by mouth daily. Ibuprofen-diphenhydrAMINE (Advil PM) 200-38 mg tab by Mouth/Throat route daily. (Patient not taking: Reported on 8/9/2022)     No current facility-administered medications for this visit.         Past Surgical History:   Procedure Laterality Date    HX GYN  1990    Hysterectomy    HX ORTHOPAEDIC  Feb 2014    Left shoulder arthroscopy and manipulation with lysis    HX ORTHOPAEDIC Right Jan 11, 2016    Small finger PIP join tfusion surgery (fracture)     HX TOTAL ABDOMINAL HYSTERECTOMY      CO ABDOMEN SURGERY PROC UNLISTED  2007    lap nissen fundoplication    CO APPENDECTOMY        Last Thursday she got up to use BR and was a little dizzy   She leaned over to in bed, when she raised head room was spinning   Symptoms resolved  in about 1 minute, with no recurrence   No history of dizziness or vertigo   Neck more stiff   She plays chair volleyball  Neck cracks   No associated n/v, visual changes nor palpitations Sometimes can't get deep sleep  Adequate hydration   No new medications or dietary change         Review of Systems   Constitutional: Negative. HENT: Negative. Eyes: Negative. Cardiovascular: Negative. Gastrointestinal: Negative. Negative for nausea and vomiting. Genitourinary: Negative. Musculoskeletal:  Positive for neck pain. Skin: Negative. Neurological:  Positive for dizziness. Negative for tingling, sensory change, speech change, focal weakness, weakness and headaches. Psychiatric/Behavioral: Negative. Objective  Visit Vitals  /80 (BP 1 Location: Left upper arm, BP Patient Position: Sitting, BP Cuff Size: Adult)   Pulse 79   Temp 97.5 °F (36.4 °C) (Oral)   Resp 18   Ht 5' 4\" (1.626 m)   Wt 130 lb 12.8 oz (59.3 kg)   SpO2 96%   BMI 22.45 kg/m²      Physical Exam  Constitutional:       General: She is not in acute distress. Appearance: Normal appearance. She is not ill-appearing. HENT:      Head: Normocephalic and atraumatic. Right Ear: Tympanic membrane, ear canal and external ear normal. There is no impacted cerumen. Left Ear: Tympanic membrane, ear canal and external ear normal. There is no impacted cerumen. Nose: Nose normal.   Eyes:      Extraocular Movements: Extraocular movements intact. Conjunctiva/sclera: Conjunctivae normal.      Pupils: Pupils are equal, round, and reactive to light. Neck:      Vascular: No carotid bruit. Cardiovascular:      Rate and Rhythm: Normal rate and regular rhythm. Pulmonary:      Effort: Pulmonary effort is normal.      Breath sounds: Normal breath sounds. Abdominal:      General: There is no distension. Tenderness: There is no abdominal tenderness. Musculoskeletal:      Right lower leg: No edema. Left lower leg: No edema. Lymphadenopathy:      Cervical: No cervical adenopathy. Neurological:      Mental Status: She is alert and oriented to person, place, and time.  Mental status is at baseline. Cranial Nerves: No cranial nerve deficit. Gait: Gait normal.   Psychiatric:         Mood and Affect: Mood normal.         Behavior: Behavior normal.         Thought Content: Thought content normal.        Assessment & Plan    ICD-10-CM ICD-9-CM    1. Dizziness  R42 780.4 AMB POC URINALYSIS DIP STICK MANUAL W/O MICRO      REFERRAL TO ENT-OTOLARYNGOLOGY      2. Orthostatic dizziness  R42 780.4         Diagnoses and all orders for this visit:    1. Dizziness  -     AMB POC URINALYSIS DIP STICK MANUAL W/O MICRO  -     REFERRAL TO ENT-OTOLARYNGOLOGY    2. Orthostatic dizziness    Follow-up and Dispositions    Return if symptoms worsen or fail to improve.      Normal urine dip  Symptoms resolved  Discussed need for ENT evaluation if symptoms recur  current treatment plan is effective, no change in therapy  Recent ab results  reviewed with patient  Encouraged adequate hydration   Melody Lopez NP

## 2022-08-09 NOTE — PROGRESS NOTES
Rm: 09      Chief Complaint   Patient presents with    Dizziness       Visit Vitals  /67 (BP 1 Location: Left upper arm, BP Patient Position: Sitting, BP Cuff Size: Adult)   Pulse 79   Temp 97.5 °F (36.4 °C) (Oral)   Resp 18   Ht 5' 4\" (1.626 m)   Wt 130 lb 12.8 oz (59.3 kg)   SpO2 96%   BMI 22.45 kg/m²       1. Have you been to the ER, urgent care clinic since your last visit? Hospitalized since your last visit? No    2. Have you seen or consulted any other health care providers outside of the [address] [place] [address] since your last visit? Include any pap smears or colon screening.  No

## 2023-05-26 ENCOUNTER — OFFICE VISIT (OUTPATIENT)
Age: 73
End: 2023-05-26
Payer: MEDICARE

## 2023-05-26 VITALS
WEIGHT: 138 LBS | BODY MASS INDEX: 23.56 KG/M2 | DIASTOLIC BLOOD PRESSURE: 77 MMHG | RESPIRATION RATE: 18 BRPM | HEART RATE: 63 BPM | HEIGHT: 64 IN | SYSTOLIC BLOOD PRESSURE: 136 MMHG | TEMPERATURE: 98 F | OXYGEN SATURATION: 100 %

## 2023-05-26 DIAGNOSIS — E03.9 ACQUIRED HYPOTHYROIDISM: ICD-10-CM

## 2023-05-26 DIAGNOSIS — M25.541 ARTHRALGIA OF BOTH HANDS: ICD-10-CM

## 2023-05-26 DIAGNOSIS — R06.9 BREATHING PROBLEM: ICD-10-CM

## 2023-05-26 DIAGNOSIS — E55.9 HYPOVITAMINOSIS D: ICD-10-CM

## 2023-05-26 DIAGNOSIS — Z12.11 SCREENING FOR COLON CANCER: ICD-10-CM

## 2023-05-26 DIAGNOSIS — M25.542 ARTHRALGIA OF BOTH HANDS: ICD-10-CM

## 2023-05-26 DIAGNOSIS — Z00.00 MEDICARE ANNUAL WELLNESS VISIT, SUBSEQUENT: Primary | ICD-10-CM

## 2023-05-26 PROCEDURE — 1123F ACP DISCUSS/DSCN MKR DOCD: CPT | Performed by: INTERNAL MEDICINE

## 2023-05-26 PROCEDURE — 99214 OFFICE O/P EST MOD 30 MIN: CPT | Performed by: INTERNAL MEDICINE

## 2023-05-26 PROCEDURE — 1036F TOBACCO NON-USER: CPT | Performed by: INTERNAL MEDICINE

## 2023-05-26 PROCEDURE — G8420 CALC BMI NORM PARAMETERS: HCPCS | Performed by: INTERNAL MEDICINE

## 2023-05-26 PROCEDURE — 3017F COLORECTAL CA SCREEN DOC REV: CPT | Performed by: INTERNAL MEDICINE

## 2023-05-26 PROCEDURE — G8399 PT W/DXA RESULTS DOCUMENT: HCPCS | Performed by: INTERNAL MEDICINE

## 2023-05-26 PROCEDURE — G8427 DOCREV CUR MEDS BY ELIG CLIN: HCPCS | Performed by: INTERNAL MEDICINE

## 2023-05-26 PROCEDURE — G0439 PPPS, SUBSEQ VISIT: HCPCS | Performed by: INTERNAL MEDICINE

## 2023-05-26 PROCEDURE — 1090F PRES/ABSN URINE INCON ASSESS: CPT | Performed by: INTERNAL MEDICINE

## 2023-05-26 SDOH — ECONOMIC STABILITY: FOOD INSECURITY: WITHIN THE PAST 12 MONTHS, THE FOOD YOU BOUGHT JUST DIDN'T LAST AND YOU DIDN'T HAVE MONEY TO GET MORE.: NEVER TRUE

## 2023-05-26 SDOH — ECONOMIC STABILITY: HOUSING INSECURITY
IN THE LAST 12 MONTHS, WAS THERE A TIME WHEN YOU DID NOT HAVE A STEADY PLACE TO SLEEP OR SLEPT IN A SHELTER (INCLUDING NOW)?: NO

## 2023-05-26 SDOH — ECONOMIC STABILITY: INCOME INSECURITY: HOW HARD IS IT FOR YOU TO PAY FOR THE VERY BASICS LIKE FOOD, HOUSING, MEDICAL CARE, AND HEATING?: NOT HARD AT ALL

## 2023-05-26 SDOH — ECONOMIC STABILITY: FOOD INSECURITY: WITHIN THE PAST 12 MONTHS, YOU WORRIED THAT YOUR FOOD WOULD RUN OUT BEFORE YOU GOT MONEY TO BUY MORE.: NEVER TRUE

## 2023-05-26 ASSESSMENT — PATIENT HEALTH QUESTIONNAIRE - PHQ9
2. FEELING DOWN, DEPRESSED OR HOPELESS: 0
1. LITTLE INTEREST OR PLEASURE IN DOING THINGS: 0
SUM OF ALL RESPONSES TO PHQ QUESTIONS 1-9: 0
SUM OF ALL RESPONSES TO PHQ9 QUESTIONS 1 & 2: 0

## 2023-05-26 ASSESSMENT — LIFESTYLE VARIABLES
HOW MANY STANDARD DRINKS CONTAINING ALCOHOL DO YOU HAVE ON A TYPICAL DAY: 1 OR 2
HOW OFTEN DO YOU HAVE A DRINK CONTAINING ALCOHOL: MONTHLY OR LESS

## 2023-05-27 LAB
25(OH)D3 SERPL-MCNC: 46.5 NG/ML (ref 30–100)
ALBUMIN SERPL-MCNC: 3.8 G/DL (ref 3.5–5)
ALBUMIN/GLOB SERPL: 1.3 (ref 1.1–2.2)
ALP SERPL-CCNC: 48 U/L (ref 45–117)
ALT SERPL-CCNC: 18 U/L (ref 12–78)
ANION GAP SERPL CALC-SCNC: 4 MMOL/L (ref 5–15)
AST SERPL-CCNC: 8 U/L (ref 15–37)
BASOPHILS # BLD: 0.1 K/UL (ref 0–0.1)
BASOPHILS NFR BLD: 1 % (ref 0–1)
BILIRUB SERPL-MCNC: 0.6 MG/DL (ref 0.2–1)
BUN SERPL-MCNC: 16 MG/DL (ref 6–20)
BUN/CREAT SERPL: 21 (ref 12–20)
CALCIUM SERPL-MCNC: 8.8 MG/DL (ref 8.5–10.1)
CHLORIDE SERPL-SCNC: 105 MMOL/L (ref 97–108)
CHOLEST SERPL-MCNC: 204 MG/DL
CO2 SERPL-SCNC: 29 MMOL/L (ref 21–32)
CREAT SERPL-MCNC: 0.77 MG/DL (ref 0.55–1.02)
DIFFERENTIAL METHOD BLD: ABNORMAL
EOSINOPHIL # BLD: 0.2 K/UL (ref 0–0.4)
EOSINOPHIL NFR BLD: 4 % (ref 0–7)
ERYTHROCYTE [DISTWIDTH] IN BLOOD BY AUTOMATED COUNT: 12.8 % (ref 11.5–14.5)
EST. AVERAGE GLUCOSE BLD GHB EST-MCNC: 108 MG/DL
GLOBULIN SER CALC-MCNC: 3 G/DL (ref 2–4)
GLUCOSE SERPL-MCNC: 89 MG/DL (ref 65–100)
HBA1C MFR BLD: 5.4 % (ref 4–5.6)
HCT VFR BLD AUTO: 41.5 % (ref 35–47)
HDLC SERPL-MCNC: 88 MG/DL
HDLC SERPL: 2.3 (ref 0–5)
HGB BLD-MCNC: 13.2 G/DL (ref 11.5–16)
IMM GRANULOCYTES # BLD AUTO: 0.1 K/UL (ref 0–0.04)
IMM GRANULOCYTES NFR BLD AUTO: 1 % (ref 0–0.5)
LDLC SERPL CALC-MCNC: 97 MG/DL (ref 0–100)
LYMPHOCYTES # BLD: 1.6 K/UL (ref 0.8–3.5)
LYMPHOCYTES NFR BLD: 36 % (ref 12–49)
MCH RBC QN AUTO: 30.5 PG (ref 26–34)
MCHC RBC AUTO-ENTMCNC: 31.8 G/DL (ref 30–36.5)
MCV RBC AUTO: 95.8 FL (ref 80–99)
MONOCYTES # BLD: 0.4 K/UL (ref 0–1)
MONOCYTES NFR BLD: 9 % (ref 5–13)
NEUTS SEG # BLD: 2.2 K/UL (ref 1.8–8)
NEUTS SEG NFR BLD: 49 % (ref 32–75)
NRBC # BLD: 0 K/UL (ref 0–0.01)
NRBC BLD-RTO: 0 PER 100 WBC
PLATELET # BLD AUTO: 249 K/UL (ref 150–400)
PMV BLD AUTO: 9.3 FL (ref 8.9–12.9)
POTASSIUM SERPL-SCNC: 4.3 MMOL/L (ref 3.5–5.1)
PROT SERPL-MCNC: 6.8 G/DL (ref 6.4–8.2)
RBC # BLD AUTO: 4.33 M/UL (ref 3.8–5.2)
SODIUM SERPL-SCNC: 138 MMOL/L (ref 136–145)
T4 FREE SERPL-MCNC: 1.1 NG/DL (ref 0.8–1.5)
TRIGL SERPL-MCNC: 95 MG/DL
TSH SERPL DL<=0.05 MIU/L-ACNC: 2.98 UIU/ML (ref 0.36–3.74)
VLDLC SERPL CALC-MCNC: 19 MG/DL
WBC # BLD AUTO: 4.5 K/UL (ref 3.6–11)

## 2023-05-30 ENCOUNTER — TELEPHONE (OUTPATIENT)
Age: 73
End: 2023-05-30

## 2023-05-31 ENCOUNTER — HOSPITAL ENCOUNTER (OUTPATIENT)
Facility: HOSPITAL | Age: 73
Discharge: HOME | End: 2023-06-03
Payer: MEDICARE

## 2023-05-31 DIAGNOSIS — R06.9 ABNORMAL RESPIRATORY RATE: ICD-10-CM

## 2023-05-31 DIAGNOSIS — R06.9 BREATHING PROBLEM: ICD-10-CM

## 2023-05-31 PROCEDURE — 71046 X-RAY EXAM CHEST 2 VIEWS: CPT

## 2023-06-01 ENCOUNTER — TELEPHONE (OUTPATIENT)
Age: 73
End: 2023-06-01

## 2023-06-02 RX ORDER — LEVOTHYROXINE SODIUM 88 UG/1
88 TABLET ORAL
Qty: 90 TABLET | Refills: 3 | Status: SHIPPED | OUTPATIENT
Start: 2023-06-02

## 2023-06-02 NOTE — TELEPHONE ENCOUNTER
Last appointment: 5/26/23  Next appointment: Sergio Houston to follow-up 5/2024  Previous refill encounter(s): 7/10/22 #90 with 3 refills    Requested Prescriptions     Pending Prescriptions Disp Refills    levothyroxine (SYNTHROID) 88 MCG tablet 90 tablet 3     Sig: Take 1 tablet by mouth Daily         For Pharmacy Admin Tracking Only    Program: Medication Refill  CPA in place:    Recommendation Provided To:    Intervention Detail: New Rx: 1, reason: Patient Preference  Intervention Accepted By:   Anju Ames Closed?:    Time Spent (min): 5

## 2023-06-03 NOTE — TELEPHONE ENCOUNTER
Refill request(s) approved--levothyroxine--90-day supply with 3 refill(s)--as requested. Refill protocol details (computer-generated) reviewed, as available.     Lab Results   Component Value Date    TSH 2.32 05/20/2022    T4FREE 1.1 05/26/2023     Component      Latest Ref Rng & Units 5/26/2023 5/26/2023           8:48 AM  8:48 AM   T4 Free      0.8 - 1.5 NG/DL 1.1    TSH, 3RD GENERATION      0.36 - 3.74 uIU/mL  2.98

## 2023-07-03 ENCOUNTER — TELEPHONE (OUTPATIENT)
Age: 73
End: 2023-07-03

## 2023-07-03 NOTE — TELEPHONE ENCOUNTER
Pt came to office this morning sitting that lab didn't receive results from FIT test nor did the office. She was instructed to get another test per lab.  FIT test was given with existing order    Milana Sheikh LPN

## 2023-07-17 ENCOUNTER — TELEPHONE (OUTPATIENT)
Age: 73
End: 2023-07-17

## 2023-07-17 NOTE — TELEPHONE ENCOUNTER
Pt dropped off a mail-out sample because she said she was told that since the last sample was lost in the mail, she should bring the next sample into our office. I accepted the sample and gave it to the . The  returned it to me an said we are not responsible for mail-in samples. I called pt and informed her the sample had to be mailed and she consented that we could mail it on her behalf. I dropped the sample in the mailbox outside of the [facility].

## 2023-10-13 ENCOUNTER — OFFICE VISIT (OUTPATIENT)
Age: 73
End: 2023-10-13
Payer: MEDICARE

## 2023-10-13 VITALS
HEART RATE: 78 BPM | RESPIRATION RATE: 17 BRPM | SYSTOLIC BLOOD PRESSURE: 119 MMHG | BODY MASS INDEX: 23.92 KG/M2 | HEIGHT: 63 IN | DIASTOLIC BLOOD PRESSURE: 69 MMHG | WEIGHT: 135 LBS | OXYGEN SATURATION: 97 % | TEMPERATURE: 97.8 F

## 2023-10-13 DIAGNOSIS — M19.041 ARTHRITIS OF RIGHT HAND: Primary | ICD-10-CM

## 2023-10-13 DIAGNOSIS — Z01.818 PREOP EXAMINATION: ICD-10-CM

## 2023-10-13 DIAGNOSIS — Z41.9 SURGERY, ELECTIVE: ICD-10-CM

## 2023-10-13 DIAGNOSIS — R06.89 YAWNING: ICD-10-CM

## 2023-10-13 DIAGNOSIS — R41.3 MEMORY DIFFICULTY: ICD-10-CM

## 2023-10-13 PROCEDURE — 1123F ACP DISCUSS/DSCN MKR DOCD: CPT | Performed by: INTERNAL MEDICINE

## 2023-10-13 PROCEDURE — 1036F TOBACCO NON-USER: CPT | Performed by: INTERNAL MEDICINE

## 2023-10-13 PROCEDURE — 93005 ELECTROCARDIOGRAM TRACING: CPT | Performed by: INTERNAL MEDICINE

## 2023-10-13 PROCEDURE — G8420 CALC BMI NORM PARAMETERS: HCPCS | Performed by: INTERNAL MEDICINE

## 2023-10-13 PROCEDURE — G8484 FLU IMMUNIZE NO ADMIN: HCPCS | Performed by: INTERNAL MEDICINE

## 2023-10-13 PROCEDURE — 99214 OFFICE O/P EST MOD 30 MIN: CPT | Performed by: INTERNAL MEDICINE

## 2023-10-13 PROCEDURE — G8399 PT W/DXA RESULTS DOCUMENT: HCPCS | Performed by: INTERNAL MEDICINE

## 2023-10-13 PROCEDURE — 3017F COLORECTAL CA SCREEN DOC REV: CPT | Performed by: INTERNAL MEDICINE

## 2023-10-13 PROCEDURE — G8427 DOCREV CUR MEDS BY ELIG CLIN: HCPCS | Performed by: INTERNAL MEDICINE

## 2023-10-13 PROCEDURE — 93010 ELECTROCARDIOGRAM REPORT: CPT | Performed by: INTERNAL MEDICINE

## 2023-10-13 PROCEDURE — 1090F PRES/ABSN URINE INCON ASSESS: CPT | Performed by: INTERNAL MEDICINE

## 2024-04-30 ENCOUNTER — AMBULATORY SURGICAL CENTER (OUTPATIENT)
Dept: URBAN - METROPOLITAN AREA SURGERY 6 | Facility: SURGERY | Age: 74
End: 2024-04-30
Payer: MEDICARE

## 2024-04-30 ENCOUNTER — AMBULATORY SURGICAL CENTER (OUTPATIENT)
Dept: URBAN - METROPOLITAN AREA PATHOLOGY 6 | Facility: PATHOLOGY | Age: 74
End: 2024-04-30
Payer: MEDICARE

## 2024-04-30 VITALS
SYSTOLIC BLOOD PRESSURE: 145 MMHG | HEART RATE: 58 BPM | DIASTOLIC BLOOD PRESSURE: 61 MMHG | TEMPERATURE: 97.2 F | RESPIRATION RATE: 16 BRPM | HEART RATE: 60 BPM | SYSTOLIC BLOOD PRESSURE: 128 MMHG | SYSTOLIC BLOOD PRESSURE: 113 MMHG | WEIGHT: 203 LBS | HEART RATE: 57 BPM | DIASTOLIC BLOOD PRESSURE: 55 MMHG | DIASTOLIC BLOOD PRESSURE: 77 MMHG | HEART RATE: 58 BPM | SYSTOLIC BLOOD PRESSURE: 113 MMHG | DIASTOLIC BLOOD PRESSURE: 61 MMHG | OXYGEN SATURATION: 97 % | HEART RATE: 65 BPM | DIASTOLIC BLOOD PRESSURE: 57 MMHG | SYSTOLIC BLOOD PRESSURE: 128 MMHG | OXYGEN SATURATION: 96 % | DIASTOLIC BLOOD PRESSURE: 50 MMHG | RESPIRATION RATE: 9 BRPM | HEART RATE: 63 BPM | RESPIRATION RATE: 10 BRPM | SYSTOLIC BLOOD PRESSURE: 112 MMHG | SYSTOLIC BLOOD PRESSURE: 114 MMHG | OXYGEN SATURATION: 94 % | DIASTOLIC BLOOD PRESSURE: 55 MMHG | WEIGHT: 203 LBS | OXYGEN SATURATION: 95 % | TEMPERATURE: 97.2 F | RESPIRATION RATE: 11 BRPM | DIASTOLIC BLOOD PRESSURE: 52 MMHG | RESPIRATION RATE: 8 BRPM | RESPIRATION RATE: 13 BRPM | RESPIRATION RATE: 11 BRPM | SYSTOLIC BLOOD PRESSURE: 118 MMHG | RESPIRATION RATE: 8 BRPM | DIASTOLIC BLOOD PRESSURE: 57 MMHG | SYSTOLIC BLOOD PRESSURE: 141 MMHG | HEART RATE: 65 BPM | OXYGEN SATURATION: 97 % | OXYGEN SATURATION: 94 % | HEIGHT: 64 IN | DIASTOLIC BLOOD PRESSURE: 50 MMHG | SYSTOLIC BLOOD PRESSURE: 111 MMHG | OXYGEN SATURATION: 96 % | HEIGHT: 64 IN | SYSTOLIC BLOOD PRESSURE: 111 MMHG | RESPIRATION RATE: 16 BRPM | DIASTOLIC BLOOD PRESSURE: 52 MMHG | RESPIRATION RATE: 13 BRPM | SYSTOLIC BLOOD PRESSURE: 112 MMHG | SYSTOLIC BLOOD PRESSURE: 145 MMHG | HEART RATE: 60 BPM | OXYGEN SATURATION: 95 % | SYSTOLIC BLOOD PRESSURE: 118 MMHG | RESPIRATION RATE: 9 BRPM | HEART RATE: 63 BPM | SYSTOLIC BLOOD PRESSURE: 114 MMHG | HEART RATE: 57 BPM | RESPIRATION RATE: 10 BRPM | SYSTOLIC BLOOD PRESSURE: 141 MMHG | DIASTOLIC BLOOD PRESSURE: 77 MMHG

## 2024-04-30 DIAGNOSIS — K63.5 POLYP OF COLON: ICD-10-CM

## 2024-04-30 DIAGNOSIS — K57.30 DIVERTICULOSIS OF LARGE INTESTINE WITHOUT PERFORATION OR ABS: ICD-10-CM

## 2024-04-30 DIAGNOSIS — K64.2 THIRD DEGREE HEMORRHOIDS: ICD-10-CM

## 2024-04-30 DIAGNOSIS — D12.4 BENIGN NEOPLASM OF DESCENDING COLON: ICD-10-CM

## 2024-04-30 DIAGNOSIS — D12.3 BENIGN NEOPLASM OF TRANSVERSE COLON: ICD-10-CM

## 2024-04-30 DIAGNOSIS — Z12.11 ENCOUNTER FOR SCREENING FOR MALIGNANT NEOPLASM OF COLON: ICD-10-CM

## 2024-04-30 PROBLEM — K64.1 SECOND DEGREE HEMORRHOIDS: Status: ACTIVE | Noted: 2024-04-30

## 2024-04-30 PROBLEM — K62.1 RECTAL POLYP: Status: ACTIVE | Noted: 2024-04-30

## 2024-04-30 PROCEDURE — 45385 COLONOSCOPY W/LESION REMOVAL: CPT | Mod: PT | Performed by: INTERNAL MEDICINE

## 2024-04-30 PROCEDURE — 45380 COLONOSCOPY AND BIOPSY: CPT | Mod: 59,PT | Performed by: INTERNAL MEDICINE

## 2024-04-30 PROCEDURE — 88305 TISSUE EXAM BY PATHOLOGIST: CPT | Performed by: PATHOLOGY

## 2024-04-30 PROCEDURE — 45380 COLONOSCOPY AND BIOPSY: CPT | Mod: PT,59 | Performed by: INTERNAL MEDICINE

## 2024-04-30 NOTE — TELEPHONE ENCOUNTER
[facility] is requesting a new prescription. Previous prescription was sent to [facility].     Last appointment: 10/13/2023 MD Gaxiola   Last labs: 05/2023  Next appointment: 06/17/2024 MD Gaxiola   Previous refill encounter(s):   06/02/2023 Synthroid #90 with 3 refills.     For Pharmacy Admin Tracking Only    Program: Medication Refill  Intervention Detail: New Rx: 1, reason: Patient Preference  Time Spent (min): 5    Requested Prescriptions     Pending Prescriptions Disp Refills    levothyroxine (SYNTHROID) 88 MCG tablet [Pharmacy Med Name: L-THYROXINE (SYNTHROID) TABS 88MCG] 90 tablet 0     Sig: TAKE 1 TABLET EVERY MORNING BEFORE BREAKFAST

## 2024-05-01 RX ORDER — LEVOTHYROXINE SODIUM 88 UG/1
88 TABLET ORAL
Qty: 90 TABLET | Refills: 0 | Status: SHIPPED | OUTPATIENT
Start: 2024-05-01

## 2024-05-02 NOTE — TELEPHONE ENCOUNTER
Refill request(s) approved--levothyroxine--90-day supply with 0 refill(s).  Plan update labs at upcoming visit.    Refill protocol details (computer-generated) reviewed, as available.      TSH, 3rd Generation   Date Value Ref Range Status   05/26/2023 2.98 0.36 - 3.74 uIU/mL Final     Comment:       Due to TSH heterogeneity, both structurally and degree of glycosylation, monoclonal antibodies used in the TSH assay may not accurately quantitate TSH. Therefore, this result should be correlated with clinical findings as well as with other assessments of thyroid function, e.g., free T4, free T3.       T4 Free   Date Value Ref Range Status   05/26/2023 1.1 0.8 - 1.5 NG/DL Final   05/20/2022 1.2 0.8 - 1.5 NG/DL Final       Future Appointments   Date Time Provider Department Center   6/17/2024  8:15 AM Sumeet Gaxiola MD [facility]

## 2024-06-14 SDOH — ECONOMIC STABILITY: TRANSPORTATION INSECURITY
IN THE PAST 12 MONTHS, HAS LACK OF TRANSPORTATION KEPT YOU FROM MEETINGS, WORK, OR FROM GETTING THINGS NEEDED FOR DAILY LIVING?: NO

## 2024-06-14 SDOH — HEALTH STABILITY: PHYSICAL HEALTH: ON AVERAGE, HOW MANY DAYS PER WEEK DO YOU ENGAGE IN MODERATE TO STRENUOUS EXERCISE (LIKE A BRISK WALK)?: 4 DAYS

## 2024-06-14 SDOH — ECONOMIC STABILITY: INCOME INSECURITY: HOW HARD IS IT FOR YOU TO PAY FOR THE VERY BASICS LIKE FOOD, HOUSING, MEDICAL CARE, AND HEATING?: NOT HARD AT ALL

## 2024-06-14 SDOH — HEALTH STABILITY: PHYSICAL HEALTH: ON AVERAGE, HOW MANY MINUTES DO YOU ENGAGE IN EXERCISE AT THIS LEVEL?: 60 MIN

## 2024-06-14 SDOH — ECONOMIC STABILITY: FOOD INSECURITY: WITHIN THE PAST 12 MONTHS, THE FOOD YOU BOUGHT JUST DIDN'T LAST AND YOU DIDN'T HAVE MONEY TO GET MORE.: NEVER TRUE

## 2024-06-14 SDOH — ECONOMIC STABILITY: FOOD INSECURITY: WITHIN THE PAST 12 MONTHS, YOU WORRIED THAT YOUR FOOD WOULD RUN OUT BEFORE YOU GOT MONEY TO BUY MORE.: NEVER TRUE

## 2024-06-14 ASSESSMENT — LIFESTYLE VARIABLES
HOW OFTEN DO YOU HAVE A DRINK CONTAINING ALCOHOL: 4
HOW OFTEN DO YOU HAVE SIX OR MORE DRINKS ON ONE OCCASION: 1
HOW MANY STANDARD DRINKS CONTAINING ALCOHOL DO YOU HAVE ON A TYPICAL DAY: 1
HOW OFTEN DO YOU HAVE A DRINK CONTAINING ALCOHOL: 2-3 TIMES A WEEK
HOW MANY STANDARD DRINKS CONTAINING ALCOHOL DO YOU HAVE ON A TYPICAL DAY: 1 OR 2

## 2024-06-14 ASSESSMENT — PATIENT HEALTH QUESTIONNAIRE - PHQ9
SUM OF ALL RESPONSES TO PHQ QUESTIONS 1-9: 0
SUM OF ALL RESPONSES TO PHQ QUESTIONS 1-9: 0
SUM OF ALL RESPONSES TO PHQ9 QUESTIONS 1 & 2: 0
1. LITTLE INTEREST OR PLEASURE IN DOING THINGS: NOT AT ALL
SUM OF ALL RESPONSES TO PHQ QUESTIONS 1-9: 0
SUM OF ALL RESPONSES TO PHQ QUESTIONS 1-9: 0
2. FEELING DOWN, DEPRESSED OR HOPELESS: NOT AT ALL

## 2024-06-17 ENCOUNTER — OFFICE VISIT (OUTPATIENT)
Age: 74
End: 2024-06-17
Payer: MEDICARE

## 2024-06-17 VITALS
HEART RATE: 68 BPM | BODY MASS INDEX: 24.24 KG/M2 | RESPIRATION RATE: 17 BRPM | SYSTOLIC BLOOD PRESSURE: 126 MMHG | TEMPERATURE: 97.5 F | DIASTOLIC BLOOD PRESSURE: 68 MMHG | WEIGHT: 136.8 LBS | HEIGHT: 63 IN | OXYGEN SATURATION: 97 %

## 2024-06-17 DIAGNOSIS — E55.9 HYPOVITAMINOSIS D: ICD-10-CM

## 2024-06-17 DIAGNOSIS — R41.3 MEMORY DIFFICULTY: ICD-10-CM

## 2024-06-17 DIAGNOSIS — Z00.00 MEDICARE ANNUAL WELLNESS VISIT, SUBSEQUENT: Primary | ICD-10-CM

## 2024-06-17 DIAGNOSIS — E03.9 ACQUIRED HYPOTHYROIDISM: ICD-10-CM

## 2024-06-17 DIAGNOSIS — Z12.11 SCREENING FOR COLON CANCER: ICD-10-CM

## 2024-06-17 LAB
25(OH)D3 SERPL-MCNC: 60.1 NG/ML (ref 30–100)
ALBUMIN SERPL-MCNC: 3.8 G/DL (ref 3.5–5)
ALBUMIN/GLOB SERPL: 1.2 (ref 1.1–2.2)
ALP SERPL-CCNC: 53 U/L (ref 45–117)
ALT SERPL-CCNC: 19 U/L (ref 12–78)
ANION GAP SERPL CALC-SCNC: 1 MMOL/L (ref 5–15)
AST SERPL-CCNC: 9 U/L (ref 15–37)
BASOPHILS # BLD: 0 K/UL (ref 0–0.1)
BASOPHILS NFR BLD: 1 % (ref 0–1)
BILIRUB SERPL-MCNC: 0.6 MG/DL (ref 0.2–1)
BUN SERPL-MCNC: 19 MG/DL (ref 6–20)
BUN/CREAT SERPL: 25 (ref 12–20)
CALCIUM SERPL-MCNC: 9.3 MG/DL (ref 8.5–10.1)
CHLORIDE SERPL-SCNC: 109 MMOL/L (ref 97–108)
CHOLEST SERPL-MCNC: 190 MG/DL
CO2 SERPL-SCNC: 29 MMOL/L (ref 21–32)
CREAT SERPL-MCNC: 0.76 MG/DL (ref 0.55–1.02)
DIFFERENTIAL METHOD BLD: NORMAL
EOSINOPHIL # BLD: 0.1 K/UL (ref 0–0.4)
EOSINOPHIL NFR BLD: 2 % (ref 0–7)
ERYTHROCYTE [DISTWIDTH] IN BLOOD BY AUTOMATED COUNT: 13.4 % (ref 11.5–14.5)
EST. AVERAGE GLUCOSE BLD GHB EST-MCNC: 103 MG/DL
GLOBULIN SER CALC-MCNC: 3.3 G/DL (ref 2–4)
GLUCOSE SERPL-MCNC: 98 MG/DL (ref 65–100)
HBA1C MFR BLD: 5.2 % (ref 4–5.6)
HCT VFR BLD AUTO: 40.3 % (ref 35–47)
HDLC SERPL-MCNC: 81 MG/DL
HDLC SERPL: 2.3 (ref 0–5)
HGB BLD-MCNC: 13.3 G/DL (ref 11.5–16)
IMM GRANULOCYTES # BLD AUTO: 0 K/UL (ref 0–0.04)
IMM GRANULOCYTES NFR BLD AUTO: 0 % (ref 0–0.5)
LDLC SERPL CALC-MCNC: 90 MG/DL (ref 0–100)
LYMPHOCYTES # BLD: 1.3 K/UL (ref 0.8–3.5)
LYMPHOCYTES NFR BLD: 28 % (ref 12–49)
MCH RBC QN AUTO: 30.9 PG (ref 26–34)
MCHC RBC AUTO-ENTMCNC: 33 G/DL (ref 30–36.5)
MCV RBC AUTO: 93.7 FL (ref 80–99)
MONOCYTES # BLD: 0.4 K/UL (ref 0–1)
MONOCYTES NFR BLD: 9 % (ref 5–13)
NEUTS SEG # BLD: 2.8 K/UL (ref 1.8–8)
NEUTS SEG NFR BLD: 60 % (ref 32–75)
NRBC # BLD: 0 K/UL (ref 0–0.01)
NRBC BLD-RTO: 0 PER 100 WBC
PLATELET # BLD AUTO: 234 K/UL (ref 150–400)
PMV BLD AUTO: 9.3 FL (ref 8.9–12.9)
POTASSIUM SERPL-SCNC: 4.6 MMOL/L (ref 3.5–5.1)
PROT SERPL-MCNC: 7.1 G/DL (ref 6.4–8.2)
RBC # BLD AUTO: 4.3 M/UL (ref 3.8–5.2)
SODIUM SERPL-SCNC: 139 MMOL/L (ref 136–145)
T4 FREE SERPL-MCNC: 1.1 NG/DL (ref 0.8–1.5)
TRIGL SERPL-MCNC: 95 MG/DL
TSH SERPL DL<=0.05 MIU/L-ACNC: 3.62 UIU/ML (ref 0.36–3.74)
VIT B12 SERPL-MCNC: 892 PG/ML (ref 193–986)
VLDLC SERPL CALC-MCNC: 19 MG/DL
WBC # BLD AUTO: 4.6 K/UL (ref 3.6–11)

## 2024-06-17 PROCEDURE — G0439 PPPS, SUBSEQ VISIT: HCPCS | Performed by: INTERNAL MEDICINE

## 2024-06-17 PROCEDURE — G8420 CALC BMI NORM PARAMETERS: HCPCS | Performed by: INTERNAL MEDICINE

## 2024-06-17 PROCEDURE — G8399 PT W/DXA RESULTS DOCUMENT: HCPCS | Performed by: INTERNAL MEDICINE

## 2024-06-17 PROCEDURE — 1090F PRES/ABSN URINE INCON ASSESS: CPT | Performed by: INTERNAL MEDICINE

## 2024-06-17 PROCEDURE — 1036F TOBACCO NON-USER: CPT | Performed by: INTERNAL MEDICINE

## 2024-06-17 PROCEDURE — 1123F ACP DISCUSS/DSCN MKR DOCD: CPT | Performed by: INTERNAL MEDICINE

## 2024-06-17 PROCEDURE — 3017F COLORECTAL CA SCREEN DOC REV: CPT | Performed by: INTERNAL MEDICINE

## 2024-06-17 PROCEDURE — G8427 DOCREV CUR MEDS BY ELIG CLIN: HCPCS | Performed by: INTERNAL MEDICINE

## 2024-06-17 PROCEDURE — 99214 OFFICE O/P EST MOD 30 MIN: CPT | Performed by: INTERNAL MEDICINE

## 2024-06-17 NOTE — PROGRESS NOTES
RM: 17  Chief Complaint   Patient presents with    Medicare AWV      Vitals:    06/17/24 0805   BP: 126/68   Pulse: 68   Resp: 17   Temp: 97.5 °F (36.4 °C)   SpO2: 97%      FASTING: Yes  Have you been to the ER, urgent care clinic since your last visit?  Hospitalized since your last visit?\"    NO  “Have you seen or consulted any other health care providers outside of [facility] since your last visit?”    NO    Click Here for Release of Records Request

## 2024-06-17 NOTE — PATIENT INSTRUCTIONS
Eating Healthy Foods: Care Instructions  With every meal, you can make healthy food choices. Try to eat a variety of fruits, vegetables, whole grains, lean proteins, and low-fat dairy products. This can help you get the right balance of nutrients, including vitamins and minerals. Small changes add up over time. You can start by adding one healthy food to your meals each day.    Try to make half your plate fruits and vegetables, one-fourth whole grains, and one-fourth lean proteins. Try including dairy with your meals.   Eat more fruits and vegetables. Try to have them with most meals and snacks.   Foods for healthy eating        Fruits   These can be fresh, frozen, canned, or dried.  Try to choose whole fruit rather than fruit juice.  Eat a variety of colors.        Vegetables   These can be fresh, frozen, canned, or dried.  Beans, peas, and lentils count too.        Whole grains   Choose whole-grain breads, cereals, and noodles.  Try brown rice.        Lean proteins   These can include lean meat, poultry, fish, and eggs.  You can also have tofu, beans, peas, lentils, nuts, and seeds.        Dairy   Try milk, yogurt, and cheese.  Choose low-fat or fat-free when you can.  If you need to, use lactose-free milk or fortified plant-based milk products, such as soy milk.        Water   Drink water when you're thirsty.  Limit sugar-sweetened drinks, including soda, fruit drinks, and sports drinks.  Where can you learn more?  Go to https://www.[employer].net/patientEd and enter T756 to learn more about \"Eating Healthy Foods: Care Instructions.\"  Current as of: September 20, 2023  Content Version: 14.1  © 9696-3902 [employer].   Care instructions adapted under license by [employer]. If you have questions about a medical condition or this instruction, always ask your healthcare professional. [employer] disclaims any warranty or liability for your use of this information.           A Healthy

## 2024-06-17 NOTE — PROGRESS NOTES
This is a Medicare Annual Wellness Visit (AWV).   The patient's medical history has been reviewed in detail, and  the computerized patient record has been updated as reviewed.    Assessment and Plan:      Diagnosis Orders   1. Medicare annual wellness visit, subsequent  CBC with Auto Differential    Comprehensive Metabolic Panel    Lipid Panel    Hemoglobin A1C    TSH    T4, Free    [facility]      2. Acquired hypothyroidism  TSH    T4, Free      3. Hypovitaminosis D  Vitamin D 25 Hydroxy      4. Memory difficulty  Vitamin B12      5. Screening for colon cancer  Occult Blood Stool Immunoassay          Diagnoses #1,5 for Medicare Wellness/Preventive visit.    Due to significant separate problems unrelated to Medicare Wellness Visit, also addressed following diagnoses/problems at visit as below (diagnoses #2-4 above).    1,5:  Screenings reviewed at visit.    2:  Lab monitoring reviewed.  Continue current medications pending lab results/review.  Plan refill once labs result, as reviewed.    3:  Lab monitoring reviewed.  Continue current medications pending lab results/review.    4:  Monitoring reviewed.      Return in 1 year (on 6/17/2025), or if symptoms worsen or fail to improve, for Medicare Wellness visit, medication follow-up, fasting labs, --needs 30min visit.  lab results and schedule of future lab studies reviewed with patient  reviewed medications and side effects in detail    Plan and evaluation (above) reviewed with pt at visit  Patient voiced understanding of plan and provided with time to ask/review questions.  After Visit Summary (AVS) provided to pt after visit with additional instructions as needed/reviewed.      Recommendations for Preventive Services Due: see orders and patient instructions/AVS.  Recommended screening schedule for the next 5-10 years is provided to the patient in written form: see Patient Instructions/AVS.      Future Appointments

## 2024-06-24 RX ORDER — LEVOTHYROXINE SODIUM 88 UG/1
88 TABLET ORAL
Qty: 90 TABLET | Refills: 3 | Status: SHIPPED | OUTPATIENT
Start: 2024-06-24

## 2024-07-12 LAB — HEMOCCULT STL QL IA: NEGATIVE

## 2024-11-18 ENCOUNTER — TRANSCRIBE ORDERS (OUTPATIENT)
Facility: HOSPITAL | Age: 74
End: 2024-11-18

## 2024-11-18 ENCOUNTER — HOSPITAL ENCOUNTER (OUTPATIENT)
Dept: NON INVASIVE DIAGNOSTICS | Facility: HOSPITAL | Age: 74
Discharge: HOME | End: 2024-11-21

## 2024-11-18 DIAGNOSIS — Z41.9 SURGERY, ELECTIVE: Primary | ICD-10-CM

## 2024-11-18 DIAGNOSIS — Z41.9 SURGERY, ELECTIVE: ICD-10-CM

## 2024-11-19 LAB
EKG ATRIAL RATE: 64 BPM
EKG DIAGNOSIS: NORMAL
EKG P-R INTERVAL: 112 MS
EKG Q-T INTERVAL: 384 MS
EKG QRS DURATION: 76 MS
EKG QTC CALCULATION (BAZETT): 396 MS
EKG R AXIS: 49 DEGREES
EKG T AXIS: 86 DEGREES
EKG VENTRICULAR RATE: 64 BPM

## 2025-01-27 ENCOUNTER — OFFICE (OUTPATIENT)
Dept: URBAN - METROPOLITAN AREA PATHOLOGY 6 | Facility: PATHOLOGY | Age: 75
End: 2025-01-27
Payer: MEDICARE

## 2025-01-27 ENCOUNTER — AMBULATORY SURGICAL CENTER (OUTPATIENT)
Dept: URBAN - METROPOLITAN AREA SURGERY 6 | Facility: SURGERY | Age: 75
End: 2025-01-27
Payer: MEDICARE

## 2025-01-27 VITALS
DIASTOLIC BLOOD PRESSURE: 70 MMHG | DIASTOLIC BLOOD PRESSURE: 77 MMHG | RESPIRATION RATE: 13 BRPM | DIASTOLIC BLOOD PRESSURE: 77 MMHG | HEIGHT: 64 IN | OXYGEN SATURATION: 95 % | WEIGHT: 193 LBS | RESPIRATION RATE: 10 BRPM | DIASTOLIC BLOOD PRESSURE: 78 MMHG | SYSTOLIC BLOOD PRESSURE: 139 MMHG | RESPIRATION RATE: 11 BRPM | SYSTOLIC BLOOD PRESSURE: 134 MMHG | SYSTOLIC BLOOD PRESSURE: 135 MMHG | OXYGEN SATURATION: 96 % | HEART RATE: 60 BPM | WEIGHT: 193 LBS | DIASTOLIC BLOOD PRESSURE: 62 MMHG | DIASTOLIC BLOOD PRESSURE: 70 MMHG | OXYGEN SATURATION: 97 % | SYSTOLIC BLOOD PRESSURE: 134 MMHG | HEIGHT: 64 IN | SYSTOLIC BLOOD PRESSURE: 139 MMHG | DIASTOLIC BLOOD PRESSURE: 62 MMHG | RESPIRATION RATE: 16 BRPM | HEART RATE: 57 BPM | HEART RATE: 58 BPM | HEART RATE: 60 BPM | OXYGEN SATURATION: 96 % | RESPIRATION RATE: 13 BRPM | RESPIRATION RATE: 11 BRPM | HEART RATE: 58 BPM | OXYGEN SATURATION: 95 % | OXYGEN SATURATION: 97 % | SYSTOLIC BLOOD PRESSURE: 135 MMHG | RESPIRATION RATE: 16 BRPM | RESPIRATION RATE: 10 BRPM | DIASTOLIC BLOOD PRESSURE: 78 MMHG | HEART RATE: 57 BPM

## 2025-01-27 DIAGNOSIS — K31.89 OTHER DISEASES OF STOMACH AND DUODENUM: ICD-10-CM

## 2025-01-27 DIAGNOSIS — K21.9 GASTRO-ESOPHAGEAL REFLUX DISEASE WITHOUT ESOPHAGITIS: ICD-10-CM

## 2025-01-27 DIAGNOSIS — K21.00 GASTRO-ESOPHAGEAL REFLUX DISEASE WITH ESOPHAGITIS, WITHOUT B: ICD-10-CM

## 2025-01-27 DIAGNOSIS — K44.9 DIAPHRAGMATIC HERNIA WITHOUT OBSTRUCTION OR GANGRENE: ICD-10-CM

## 2025-01-27 DIAGNOSIS — K31.7 POLYP OF STOMACH AND DUODENUM: ICD-10-CM

## 2025-01-27 PROBLEM — K22.89 OTHER SPECIFIED DISEASE OF ESOPHAGUS: Status: ACTIVE | Noted: 2025-01-27

## 2025-01-27 PROBLEM — K29.70 GASTRITIS, UNSPECIFIED, WITHOUT BLEEDING: Status: ACTIVE | Noted: 2025-01-27

## 2025-01-27 PROBLEM — K20.80 OTHER ESOPHAGITIS WITHOUT BLEEDING: Status: ACTIVE | Noted: 2025-01-27

## 2025-01-27 PROBLEM — D37.1 NEOPLASM OF UNCERTAIN BEHAVIOR OF STOMACH: Status: ACTIVE | Noted: 2025-01-27

## 2025-01-27 PROBLEM — B37.81 CANDIDAL ESOPHAGITIS: Status: ACTIVE | Noted: 2025-01-27

## 2025-01-27 PROCEDURE — 88305 TISSUE EXAM BY PATHOLOGIST: CPT | Performed by: PATHOLOGY

## 2025-01-27 PROCEDURE — 43239 EGD BIOPSY SINGLE/MULTIPLE: CPT | Performed by: INTERNAL MEDICINE

## 2025-01-27 PROCEDURE — 88313 SPECIAL STAINS GROUP 2: CPT | Performed by: PATHOLOGY

## 2025-01-27 PROCEDURE — 88342 IMHCHEM/IMCYTCHM 1ST ANTB: CPT | Performed by: PATHOLOGY

## 2025-01-27 RX ORDER — FLUCONAZOLE 200 MG/1
200 TABLET ORAL
Qty: 16 | Refills: 0 | Status: ACTIVE
Start: 2025-01-27

## 2025-03-21 ENCOUNTER — OFFICE VISIT (OUTPATIENT)
Age: 75
End: 2025-03-21
Payer: MEDICARE

## 2025-03-21 VITALS
TEMPERATURE: 97.9 F | WEIGHT: 142.8 LBS | DIASTOLIC BLOOD PRESSURE: 65 MMHG | HEART RATE: 73 BPM | BODY MASS INDEX: 25.3 KG/M2 | OXYGEN SATURATION: 94 % | HEIGHT: 63 IN | RESPIRATION RATE: 17 BRPM | SYSTOLIC BLOOD PRESSURE: 111 MMHG

## 2025-03-21 DIAGNOSIS — Z71.1 CONCERN ABOUT MEMORY: Primary | ICD-10-CM

## 2025-03-21 DIAGNOSIS — R42 RECURRENT VERTIGO: ICD-10-CM

## 2025-03-21 DIAGNOSIS — R42 DIZZINESS: ICD-10-CM

## 2025-03-21 PROCEDURE — 1036F TOBACCO NON-USER: CPT | Performed by: INTERNAL MEDICINE

## 2025-03-21 PROCEDURE — 1090F PRES/ABSN URINE INCON ASSESS: CPT | Performed by: INTERNAL MEDICINE

## 2025-03-21 PROCEDURE — G8417 CALC BMI ABV UP PARAM F/U: HCPCS | Performed by: INTERNAL MEDICINE

## 2025-03-21 PROCEDURE — 3017F COLORECTAL CA SCREEN DOC REV: CPT | Performed by: INTERNAL MEDICINE

## 2025-03-21 PROCEDURE — G8427 DOCREV CUR MEDS BY ELIG CLIN: HCPCS | Performed by: INTERNAL MEDICINE

## 2025-03-21 PROCEDURE — 1126F AMNT PAIN NOTED NONE PRSNT: CPT | Performed by: INTERNAL MEDICINE

## 2025-03-21 PROCEDURE — 99214 OFFICE O/P EST MOD 30 MIN: CPT | Performed by: INTERNAL MEDICINE

## 2025-03-21 PROCEDURE — G8399 PT W/DXA RESULTS DOCUMENT: HCPCS | Performed by: INTERNAL MEDICINE

## 2025-03-21 PROCEDURE — 1123F ACP DISCUSS/DSCN MKR DOCD: CPT | Performed by: INTERNAL MEDICINE

## 2025-03-21 SDOH — ECONOMIC STABILITY: FOOD INSECURITY: WITHIN THE PAST 12 MONTHS, YOU WORRIED THAT YOUR FOOD WOULD RUN OUT BEFORE YOU GOT MONEY TO BUY MORE.: NEVER TRUE

## 2025-03-21 SDOH — ECONOMIC STABILITY: FOOD INSECURITY: WITHIN THE PAST 12 MONTHS, THE FOOD YOU BOUGHT JUST DIDN'T LAST AND YOU DIDN'T HAVE MONEY TO GET MORE.: NEVER TRUE

## 2025-03-21 SDOH — ECONOMIC STABILITY: INCOME INSECURITY: IN THE LAST 12 MONTHS, WAS THERE A TIME WHEN YOU WERE NOT ABLE TO PAY THE MORTGAGE OR RENT ON TIME?: NO

## 2025-03-21 SDOH — ECONOMIC STABILITY: TRANSPORTATION INSECURITY
IN THE PAST 12 MONTHS, HAS THE LACK OF TRANSPORTATION KEPT YOU FROM MEDICAL APPOINTMENTS OR FROM GETTING MEDICATIONS?: NO

## 2025-03-21 ASSESSMENT — PATIENT HEALTH QUESTIONNAIRE - PHQ9
SUM OF ALL RESPONSES TO PHQ QUESTIONS 1-9: 0
2. FEELING DOWN, DEPRESSED OR HOPELESS: NOT AT ALL
SUM OF ALL RESPONSES TO PHQ QUESTIONS 1-9: 0
1. LITTLE INTEREST OR PLEASURE IN DOING THINGS: NOT AT ALL
SUM OF ALL RESPONSES TO PHQ QUESTIONS 1-9: 0
SUM OF ALL RESPONSES TO PHQ QUESTIONS 1-9: 0

## 2025-03-21 NOTE — PROGRESS NOTES
Tegan Faith (: 1950) is a 74 y.o. female, established patient, here for evaluation of the following chief complaint(s):  Chief Complaint   Patient presents with    Memory Loss     Need to be checked and she states that she is forgetting a lot of things     Dizziness       Assessment and Plan:      Diagnosis Orders   1. Concern about memory  [facility] - Zeina Holder PsyD, Neuropsychology, [place] [facility])    [facility] - Puja Noel MD, Neurology, [facility]      2. Recurrent vertigo        3. Dizziness            1:  Referral(s) and referral coordination reviewed with patient at visit.    2:  Evaluation with ENT/PT/Neuro reviewed as needed.      Return if symptoms worsen or fail to improve, for Medicare Wellness visit, fasting labs--as scheduled.  lab results and schedule of future lab studies reviewed with patient  reviewed medications and side effects in detail  radiology results and schedule of future radiology studies reviewed with patient    Plan and evaluation (above) reviewed with pt at visit  Patient voiced understanding of plan and provided with time to ask/review questions.  After Visit Summary (AVS) provided to pt after visit with additional instructions as needed/reviewed.      ENT vestib, meclizine reviewed PRN.  Behav Health for possible MH overlay reviewe.      Future Appointments   Date Time Provider Department Center   [facility]  9[facility]30 [facility]   2025  8:20 AM Sumeet Gaxiola MD [facility] ECC DEP   2025  9:00 AM Zeina Holder PSYD [facility] JERRY BS AMB   2025 11:30 AM NEUROPSYCH TESTING [facility] JERRY BS AMB   2025  1:00 PM Zeina Holder PSYD [facility] JERRY BS AMB   --Updated future visits after patient check-out.      History of Present Illness:     Notes (nursing/rooming note copied below in italics and/or gray-shaded):  As above and in nursing note.

## 2025-03-21 NOTE — PROGRESS NOTES
RM: 16  Chief Complaint   Patient presents with    Memory Loss     Need to be checked and she states that she is forgetting a lot of things     Dizziness      Vitals:    03/21/25 1519   BP: (!) 142/74   Pulse: 73   Resp: 17   Temp: 97.9 °F (36.6 °C)   SpO2: 94%      FASTING: No  Have you been to the ER, urgent care clinic since your last visit?  Hospitalized since your last visit?\"    NO  “Have you seen or consulted any other health care providers outside of [facility] since your last visit?”    NO    Click Here for Release of Records Request

## 2025-03-25 ASSESSMENT — ANXIETY QUESTIONNAIRES
IF YOU CHECKED OFF ANY PROBLEMS ON THIS QUESTIONNAIRE, HOW DIFFICULT HAVE THESE PROBLEMS MADE IT FOR YOU TO DO YOUR WORK, TAKE CARE OF THINGS AT HOME, OR GET ALONG WITH OTHER PEOPLE: NOT DIFFICULT AT ALL
4. TROUBLE RELAXING: NOT AT ALL
4. TROUBLE RELAXING: NOT AT ALL
IF YOU CHECKED OFF ANY PROBLEMS ON THIS QUESTIONNAIRE, HOW DIFFICULT HAVE THESE PROBLEMS MADE IT FOR YOU TO DO YOUR WORK, TAKE CARE OF THINGS AT HOME, OR GET ALONG WITH OTHER PEOPLE: NOT DIFFICULT AT ALL
5. BEING SO RESTLESS THAT IT IS HARD TO SIT STILL: NOT AT ALL
6. BECOMING EASILY ANNOYED OR IRRITABLE: NOT AT ALL
1. FEELING NERVOUS, ANXIOUS, OR ON EDGE: SEVERAL DAYS
6. BECOMING EASILY ANNOYED OR IRRITABLE: NOT AT ALL
1. FEELING NERVOUS, ANXIOUS, OR ON EDGE: SEVERAL DAYS
5. BEING SO RESTLESS THAT IT IS HARD TO SIT STILL: NOT AT ALL

## 2025-03-25 ASSESSMENT — LIFESTYLE VARIABLES
HAVE YOU EVER RECEIVED ALCOHOL OR OTHER DRUG ABUSE TREATMENT: NO
PAST THREE MONTHS WHAT IS THE LARGEST AMOUNT OF ALCOHOLIC DRINKS YOU HAVE CONSUMED IN ONE DAY: 1
HISTORY_ALCOHOL_USE: NO
ALCOHOL_DAYS_PER_WEEK: 1

## 2025-03-26 ENCOUNTER — OFFICE VISIT (OUTPATIENT)
Age: 75
End: 2025-03-26
Payer: MEDICARE

## 2025-03-26 ENCOUNTER — TELEPHONE (OUTPATIENT)
Age: 75
End: 2025-03-26

## 2025-03-26 VITALS
WEIGHT: 142 LBS | DIASTOLIC BLOOD PRESSURE: 77 MMHG | SYSTOLIC BLOOD PRESSURE: 129 MMHG | RESPIRATION RATE: 16 BRPM | HEART RATE: 69 BPM | OXYGEN SATURATION: 98 % | BODY MASS INDEX: 25.16 KG/M2 | HEIGHT: 63 IN | TEMPERATURE: 98.1 F

## 2025-03-26 DIAGNOSIS — R41.3 MEMORY DEFICIT: ICD-10-CM

## 2025-03-26 DIAGNOSIS — R41.3 MEMORY DISTURBANCE: Primary | ICD-10-CM

## 2025-03-26 PROCEDURE — G8417 CALC BMI ABV UP PARAM F/U: HCPCS | Performed by: NURSE PRACTITIONER

## 2025-03-26 PROCEDURE — 3017F COLORECTAL CA SCREEN DOC REV: CPT | Performed by: NURSE PRACTITIONER

## 2025-03-26 PROCEDURE — 1159F MED LIST DOCD IN RCRD: CPT | Performed by: NURSE PRACTITIONER

## 2025-03-26 PROCEDURE — 1036F TOBACCO NON-USER: CPT | Performed by: NURSE PRACTITIONER

## 2025-03-26 PROCEDURE — 1090F PRES/ABSN URINE INCON ASSESS: CPT | Performed by: NURSE PRACTITIONER

## 2025-03-26 PROCEDURE — 1126F AMNT PAIN NOTED NONE PRSNT: CPT | Performed by: NURSE PRACTITIONER

## 2025-03-26 PROCEDURE — 1123F ACP DISCUSS/DSCN MKR DOCD: CPT | Performed by: NURSE PRACTITIONER

## 2025-03-26 PROCEDURE — G8427 DOCREV CUR MEDS BY ELIG CLIN: HCPCS | Performed by: NURSE PRACTITIONER

## 2025-03-26 PROCEDURE — 1160F RVW MEDS BY RX/DR IN RCRD: CPT | Performed by: NURSE PRACTITIONER

## 2025-03-26 PROCEDURE — G8399 PT W/DXA RESULTS DOCUMENT: HCPCS | Performed by: NURSE PRACTITIONER

## 2025-03-26 PROCEDURE — 99204 OFFICE O/P NEW MOD 45 MIN: CPT | Performed by: NURSE PRACTITIONER

## 2025-03-26 RX ORDER — MEMANTINE HYDROCHLORIDE 5 MG/1
5 TABLET ORAL 2 TIMES DAILY
Qty: 180 TABLET | Refills: 1 | Status: CANCELLED | OUTPATIENT
Start: 2025-03-26

## 2025-03-26 RX ORDER — DONEPEZIL HYDROCHLORIDE 5 MG/1
5 TABLET, FILM COATED ORAL NIGHTLY
Qty: 30 TABLET | Refills: 3 | Status: SHIPPED | OUTPATIENT
Start: 2025-03-26

## 2025-03-26 ASSESSMENT — PATIENT HEALTH QUESTIONNAIRE - PHQ9
SUM OF ALL RESPONSES TO PHQ QUESTIONS 1-9: 7
2. FEELING DOWN, DEPRESSED OR HOPELESS: SEVERAL DAYS
9. THOUGHTS THAT YOU WOULD BE BETTER OFF DEAD, OR OF HURTING YOURSELF: NOT AT ALL
8. MOVING OR SPEAKING SO SLOWLY THAT OTHER PEOPLE COULD HAVE NOTICED. OR THE OPPOSITE, BEING SO FIGETY OR RESTLESS THAT YOU HAVE BEEN MOVING AROUND A LOT MORE THAN USUAL: NOT AT ALL
SUM OF ALL RESPONSES TO PHQ QUESTIONS 1-9: 7
4. FEELING TIRED OR HAVING LITTLE ENERGY: SEVERAL DAYS
10. IF YOU CHECKED OFF ANY PROBLEMS, HOW DIFFICULT HAVE THESE PROBLEMS MADE IT FOR YOU TO DO YOUR WORK, TAKE CARE OF THINGS AT HOME, OR GET ALONG WITH OTHER PEOPLE: NOT DIFFICULT AT ALL
3. TROUBLE FALLING OR STAYING ASLEEP: SEVERAL DAYS
SUM OF ALL RESPONSES TO PHQ QUESTIONS 1-9: 7
1. LITTLE INTEREST OR PLEASURE IN DOING THINGS: NOT AT ALL
SUM OF ALL RESPONSES TO PHQ QUESTIONS 1-9: 7
7. TROUBLE CONCENTRATING ON THINGS, SUCH AS READING THE NEWSPAPER OR WATCHING TELEVISION: MORE THAN HALF THE DAYS
5. POOR APPETITE OR OVEREATING: SEVERAL DAYS
6. FEELING BAD ABOUT YOURSELF - OR THAT YOU ARE A FAILURE OR HAVE LET YOURSELF OR YOUR FAMILY DOWN: SEVERAL DAYS

## 2025-03-26 NOTE — TELEPHONE ENCOUNTER
HIPAA Verified (if caller is someone other than patient): N/A       Reason for Call:     If calling to cancel, does patient want to reschedule?   N/A    Is this call related to results?   no    If yes, please let patient know they must wait for their follow up / feedback appointment to discuss.  They can, however,  a copy of the results at the clinic 2-3 weeks after their testing appt.     Is this a New Patient Appointment Request?   yes    If yes:   Requested Provider (choose all that apply - patient doesn't need to call ALL locations):   Glory     Referred By:  MAYITO KO     Referral in chart?   yes    Patient's Insurance Carrier (please ensure you gather insurance information):       Additional notes / reason for call:       **Please advise callers that it could take up to 5 business days for a return call**        Message: (any additional details from patient/caller not covered above)          Level 1 Calls - attempted to reach practice? N/A     Reason Call Marked High Priority (if applicable):

## 2025-03-26 NOTE — TELEPHONE ENCOUNTER
Please contact patient to schedule with Dr. Noel NP appt. (Dementia)    Note: Patient is confused, and you may have repeat yourself.  She is seeing Dr. Holder in Nov 2025 at [facility]. She also sees a mental health provider, and she is confused on how Dr. Noel fits in to the picture.  I explained in detail. Neuropsych, Neurology etc.  I advised patient to keep her phone close so she doesn't miss any the call.       Thanks!

## 2025-03-26 NOTE — PROGRESS NOTES
Chief Complaint   Patient presents with    New Patient       History of Present Ilness:   Tegan Faith 74 y.o.  year old female with a reported history of memory related issues. The patient presents today after referral from PCP. The patient has also been referred to neurology and psychneurology. As of the date of this visit the patient has not made appointments for neurology or psychoneurology. The patient is not on any medications for any psychiatric issues and denies any psychiatric history.  Tegan presents today with complaints of having a hard time remembering people, appointments, and other things.  The patient also presents with anomia.  The patient reports that her sister has dementia, her brother has alcohol-induced dementia, and her   from frontotemporal lobe dementia.  Patient states that she first noticed her symptoms 6 months ago not sure if this is related to dementia.  She states the episodes come and go.  She cannot detail anything that makes memory problems worse or better.  The patient also complains of anomia, dizzy spells where her head swims.  Tegan is able to recall what she had for breakfast and lunch which were protein shakes.  The patient states she often takes protein shakes for breakfast and lunch and dinner.  She states that her appetite is poor.  She states that she has always been a terrible eater.  The patient was able to recall that she had yoga yesterday and states that she has a 2 times per week.  This is a senior's class.  She also states that she plays cards with her friends, watch TV and read a book on yesterday.  She does state that when reading before bed she cannot remember most of what she read the next day and will read pages over again to refresh her memory the following day.  The patient states that within the last 90 days she has a new great grandbaby.  She states that she can remember a short list of grocery items.  However she does state that on a daily 
around a lot more than usual 0     Thoughts that you would be better off dead, or of hurting yourself in some way 0     PHQ-2 Score 1 0 0   PHQ-9 Total Score 7 0 0   If you checked off any problems, how difficult have these problems made it for you to do your work, take care of things at home, or get along with other people? 0       generalized anxiety disorder      3/25/2025     5:04 PM   LAYTON-7 SCREENING   Feeling nervous, anxious, or on edge Several days   Trouble relaxing Not at all   Being so restless that it is hard to sit still Not at all   Becoming easily annoyed or irritable Not at all   How difficult have these problems made it for you to do your work, take care of things at home, or get along with other people? Not difficult at all        \"Have you been to the ER, urgent care clinic since your last visit?  Hospitalized since your last visit?\"    NO    “Have you seen or consulted any other health care providers outside of [facility] since your last visit?”    NO

## 2025-03-28 ENCOUNTER — TELEPHONE (OUTPATIENT)
Age: 75
End: 2025-03-28

## 2025-03-28 LAB
BASOPHILS # BLD AUTO: 0.1 X10E3/UL (ref 0–0.2)
BASOPHILS NFR BLD AUTO: 1 %
CHLORIDE SERPL-SCNC: 102 MMOL/L (ref 96–106)
CO2 SERPL-SCNC: 22 MMOL/L (ref 20–29)
EOSINOPHIL # BLD AUTO: 0.1 X10E3/UL (ref 0–0.4)
EOSINOPHIL NFR BLD AUTO: 1 %
ERYTHROCYTE [DISTWIDTH] IN BLOOD BY AUTOMATED COUNT: 12.4 % (ref 11.7–15.4)
FOLATE SERPL-MCNC: >20 NG/ML
HCT VFR BLD AUTO: 39.7 % (ref 34–46.6)
HGB BLD-MCNC: 13.6 G/DL (ref 11.1–15.9)
IMM GRANULOCYTES # BLD AUTO: 0 X10E3/UL (ref 0–0.1)
IMM GRANULOCYTES NFR BLD AUTO: 1 %
LYMPHOCYTES # BLD AUTO: 2 X10E3/UL (ref 0.7–3.1)
LYMPHOCYTES NFR BLD AUTO: 28 %
MCH RBC QN AUTO: 31.8 PG (ref 26.6–33)
MCHC RBC AUTO-ENTMCNC: 34.3 G/DL (ref 31.5–35.7)
MCV RBC AUTO: 93 FL (ref 79–97)
MONOCYTES # BLD AUTO: 0.6 X10E3/UL (ref 0.1–0.9)
MONOCYTES NFR BLD AUTO: 8 %
NEUTROPHILS # BLD AUTO: 4.6 X10E3/UL (ref 1.4–7)
NEUTROPHILS NFR BLD AUTO: 61 %
PLATELET # BLD AUTO: 253 X10E3/UL (ref 150–450)
POTASSIUM SERPL-SCNC: 4.5 MMOL/L (ref 3.5–5.2)
RBC # BLD AUTO: 4.28 X10E6/UL (ref 3.77–5.28)
SODIUM SERPL-SCNC: 140 MMOL/L (ref 134–144)
SPECIMEN STATUS REPORT: NORMAL
T4 FREE SERPL-MCNC: 1.38 NG/DL (ref 0.82–1.77)
TSH SERPL DL<=0.005 MIU/L-ACNC: 3.29 UIU/ML (ref 0.45–4.5)
VIT B12 SERPL-MCNC: 979 PG/ML (ref 232–1245)
WBC # BLD AUTO: 7.3 X10E3/UL (ref 3.4–10.8)

## 2025-03-28 NOTE — TELEPHONE ENCOUNTER
Returned call and left a message to call back to schedule a Dementia eval and braincheck with Dr. Noel.

## 2025-03-30 PROBLEM — R41.3 MEMORY DISTURBANCE: Status: ACTIVE | Noted: 2025-03-30

## 2025-03-31 ENCOUNTER — TELEPHONE (OUTPATIENT)
Age: 75
End: 2025-03-31

## 2025-03-31 ENCOUNTER — RESULTS FOLLOW-UP (OUTPATIENT)
Age: 75
End: 2025-03-31

## 2025-03-31 NOTE — TELEPHONE ENCOUNTER
Caller: Tegan Faith  Telephone: [phone]    Reason: Received her lab results and would like to discuss them with MYLES Ramirez

## 2025-04-01 LAB
AL BETA40 PLAS-MCNC: 176.61 PG/ML
AL BETA42 PLAS-MCNC: 15.7 PG/ML
BETA-AMYLOID 42/40 RATIO: 0.09
LABORATORY COMMENT REPORT: ABNORMAL
P-TAU217: 0.46 PG/ML (ref 0–0.18)

## 2025-04-01 NOTE — TELEPHONE ENCOUNTER
Talked with patient about overall blood work she had completed - beta amyloid 42/40 ratio, [id number], CMP, CBC, electrolyte panel, vitamin B12 and folate, and TSH and free T4 panel.  Patient was concerned about having a diagnosis of dementia related to the first 2 test mentioned.  Reiterated to the patient that this is just 1 of many steps that would need to be taken to definitively diagnose dementia.  Explained to the patient that the neurologist which she has an appointment for in August 2025 will perform testing and that the blood test or just a piece of the entire diagnostic process.  Explained to the patient to continue medications of Aricept that she had been prescribed and that if there is a diagnosis of dementia or dementia related disorders that neurology will take over with prescribing her medications.  The patient denies any side effects from the medication and states that she feels as not as confused.  Again I reiterated to the patient that the blood tests in and of themselves are not definitive and diagnosing dementia or dementia related disorders.    The patient did express understanding and states that she will keep her appointment in August 2025 with the neurologist.

## 2025-04-21 ASSESSMENT — PATIENT HEALTH QUESTIONNAIRE - PHQ9
10. IF YOU CHECKED OFF ANY PROBLEMS, HOW DIFFICULT HAVE THESE PROBLEMS MADE IT FOR YOU TO DO YOUR WORK, TAKE CARE OF THINGS AT HOME, OR GET ALONG WITH OTHER PEOPLE: NOT DIFFICULT AT ALL
5. POOR APPETITE OR OVEREATING: NOT AT ALL
SUM OF ALL RESPONSES TO PHQ QUESTIONS 1-9: 0
10. IF YOU CHECKED OFF ANY PROBLEMS, HOW DIFFICULT HAVE THESE PROBLEMS MADE IT FOR YOU TO DO YOUR WORK, TAKE CARE OF THINGS AT HOME, OR GET ALONG WITH OTHER PEOPLE: NOT DIFFICULT AT ALL
6. FEELING BAD ABOUT YOURSELF - OR THAT YOU ARE A FAILURE OR HAVE LET YOURSELF OR YOUR FAMILY DOWN: NOT AT ALL
SUM OF ALL RESPONSES TO PHQ QUESTIONS 1-9: 0
6. FEELING BAD ABOUT YOURSELF - OR THAT YOU ARE A FAILURE OR HAVE LET YOURSELF OR YOUR FAMILY DOWN: NOT AT ALL
SUM OF ALL RESPONSES TO PHQ QUESTIONS 1-9: 0
4. FEELING TIRED OR HAVING LITTLE ENERGY: NOT AT ALL
3. TROUBLE FALLING OR STAYING ASLEEP: NOT AT ALL
3. TROUBLE FALLING OR STAYING ASLEEP: NOT AT ALL
2. FEELING DOWN, DEPRESSED OR HOPELESS: NOT AT ALL
7. TROUBLE CONCENTRATING ON THINGS, SUCH AS READING THE NEWSPAPER OR WATCHING TELEVISION: NOT AT ALL
SUM OF ALL RESPONSES TO PHQ QUESTIONS 1-9: 0
SUM OF ALL RESPONSES TO PHQ QUESTIONS 1-9: 0
8. MOVING OR SPEAKING SO SLOWLY THAT OTHER PEOPLE COULD HAVE NOTICED. OR THE OPPOSITE - BEING SO FIDGETY OR RESTLESS THAT YOU HAVE BEEN MOVING AROUND A LOT MORE THAN USUAL: NOT AT ALL
4. FEELING TIRED OR HAVING LITTLE ENERGY: NOT AT ALL
1. LITTLE INTEREST OR PLEASURE IN DOING THINGS: NOT AT ALL
2. FEELING DOWN, DEPRESSED OR HOPELESS: NOT AT ALL
8. MOVING OR SPEAKING SO SLOWLY THAT OTHER PEOPLE COULD HAVE NOTICED. OR THE OPPOSITE, BEING SO FIGETY OR RESTLESS THAT YOU HAVE BEEN MOVING AROUND A LOT MORE THAN USUAL: NOT AT ALL
7. TROUBLE CONCENTRATING ON THINGS, SUCH AS READING THE NEWSPAPER OR WATCHING TELEVISION: NOT AT ALL
1. LITTLE INTEREST OR PLEASURE IN DOING THINGS: NOT AT ALL
9. THOUGHTS THAT YOU WOULD BE BETTER OFF DEAD, OR OF HURTING YOURSELF: NOT AT ALL
9. THOUGHTS THAT YOU WOULD BE BETTER OFF DEAD, OR OF HURTING YOURSELF: NOT AT ALL
5. POOR APPETITE OR OVEREATING: NOT AT ALL

## 2025-04-21 ASSESSMENT — ANXIETY QUESTIONNAIRES
1. FEELING NERVOUS, ANXIOUS, OR ON EDGE: NOT AT ALL
7. FEELING AFRAID AS IF SOMETHING AWFUL MIGHT HAPPEN: NOT AT ALL
5. BEING SO RESTLESS THAT IT IS HARD TO SIT STILL: NOT AT ALL
7. FEELING AFRAID AS IF SOMETHING AWFUL MIGHT HAPPEN: NOT AT ALL
3. WORRYING TOO MUCH ABOUT DIFFERENT THINGS: NOT AT ALL
GAD7 TOTAL SCORE: 0
2. NOT BEING ABLE TO STOP OR CONTROL WORRYING: NOT AT ALL
4. TROUBLE RELAXING: NOT AT ALL
6. BECOMING EASILY ANNOYED OR IRRITABLE: NOT AT ALL
4. TROUBLE RELAXING: NOT AT ALL
1. FEELING NERVOUS, ANXIOUS, OR ON EDGE: NOT AT ALL
5. BEING SO RESTLESS THAT IT IS HARD TO SIT STILL: NOT AT ALL
2. NOT BEING ABLE TO STOP OR CONTROL WORRYING: NOT AT ALL
6. BECOMING EASILY ANNOYED OR IRRITABLE: NOT AT ALL
3. WORRYING TOO MUCH ABOUT DIFFERENT THINGS: NOT AT ALL

## 2025-04-21 ASSESSMENT — COLUMBIA-SUICIDE SEVERITY RATING SCALE - C-SSRS
1. IN THE PAST MONTH, HAVE YOU WISHED YOU WERE DEAD OR WISHED YOU COULD GO TO SLEEP AND NOT WAKE UP?: 0
2. HAVE YOU ACTUALLY HAD ANY THOUGHTS OF KILLING YOURSELF?: 0
6. IN YOUR LIFETIME, HAVE YOU EVER DONE ANYTHING, STARTED TO DO ANYTHING, OR PREPARED TO DO ANYTHING TO END YOUR LIFE?: 0

## 2025-04-22 NOTE — PROGRESS NOTES
CHIEF COMPLAINT:  Tegan Faith is a 74 y.o. female and was seen today for follow-up of psychiatric condition and psychotropic medication management.     HPI:    Tegan reports the following psychiatric symptoms by hx: Memory issues.  Overall symptoms have been present for approximately 7 months. Currently memory issues is of moderate severity. The symptoms occur in increasing frequency and intensity.  Pt reports medications are donepezil 5 mg orally daily. Side Effects:  none Patient Appetite:good. Sleep: good.  Patient states that she sleeps at least 8 hours per night without any issues.  Tegan denies any new stressors.  Patient states that she is safe at home.  She endorses still meeting up with her friend group and playing games.  She also states that they are playing harder card games and she feels somewhat intimidated and does not want to participate for fear of not being able to learn the new card game.  She also states that she keeps herself busy by making cards for her friends.    Tegan denies depression, anxiety, suicidal ideation, homicidal ideation, delusions, and hallucinations.    Current Outpatient Medications on File Prior to Visit   Medication Sig Dispense Refill    levothyroxine (SYNTHROID) 88 MCG tablet Take 1 tablet by mouth every morning (before breakfast) 90 tablet 3    Cholecalciferol (VITAMIN D) 50 MCG (2000 UT) CAPS capsule Take by mouth      Multiple Vitamin (MULTIVITAMIN ADULT PO) Take 1 tablet by mouth daily      GLUCOSAMINE SULFATE PO Take 2 tablets by mouth daily      cyanocobalamin 1000 MCG tablet Take 1 tablet by mouth daily      estradiol (ESTRACE) 1 MG tablet Take 1 tablet by mouth daily       No current facility-administered medications on file prior to visit.        PDMP Monitoring:    Last PDMP James as Reviewed:  Review User Review Instant Review Result   LISA DAVIS 4/22/2025  8:57 AM Reviewed PDMP [1]       Urine Drug Screenings (1 yr)    No resulted procedures

## 2025-04-23 ENCOUNTER — OFFICE VISIT (OUTPATIENT)
Age: 75
End: 2025-04-23
Payer: MEDICARE

## 2025-04-23 VITALS
WEIGHT: 140 LBS | SYSTOLIC BLOOD PRESSURE: 123 MMHG | RESPIRATION RATE: 16 BRPM | TEMPERATURE: 98.2 F | DIASTOLIC BLOOD PRESSURE: 60 MMHG | HEART RATE: 74 BPM | OXYGEN SATURATION: 98 % | BODY MASS INDEX: 24.8 KG/M2 | HEIGHT: 63 IN

## 2025-04-23 DIAGNOSIS — R41.3 MEMORY DISTURBANCE: Primary | ICD-10-CM

## 2025-04-23 PROCEDURE — 1160F RVW MEDS BY RX/DR IN RCRD: CPT | Performed by: NURSE PRACTITIONER

## 2025-04-23 PROCEDURE — G8427 DOCREV CUR MEDS BY ELIG CLIN: HCPCS | Performed by: NURSE PRACTITIONER

## 2025-04-23 PROCEDURE — 99213 OFFICE O/P EST LOW 20 MIN: CPT | Performed by: NURSE PRACTITIONER

## 2025-04-23 PROCEDURE — 1159F MED LIST DOCD IN RCRD: CPT | Performed by: NURSE PRACTITIONER

## 2025-04-23 PROCEDURE — 1036F TOBACCO NON-USER: CPT | Performed by: NURSE PRACTITIONER

## 2025-04-23 PROCEDURE — 1123F ACP DISCUSS/DSCN MKR DOCD: CPT | Performed by: NURSE PRACTITIONER

## 2025-04-23 PROCEDURE — 3017F COLORECTAL CA SCREEN DOC REV: CPT | Performed by: NURSE PRACTITIONER

## 2025-04-23 PROCEDURE — G8399 PT W/DXA RESULTS DOCUMENT: HCPCS | Performed by: NURSE PRACTITIONER

## 2025-04-23 PROCEDURE — 1126F AMNT PAIN NOTED NONE PRSNT: CPT | Performed by: NURSE PRACTITIONER

## 2025-04-23 PROCEDURE — 1090F PRES/ABSN URINE INCON ASSESS: CPT | Performed by: NURSE PRACTITIONER

## 2025-04-23 PROCEDURE — G8420 CALC BMI NORM PARAMETERS: HCPCS | Performed by: NURSE PRACTITIONER

## 2025-04-23 RX ORDER — DONEPEZIL HYDROCHLORIDE 5 MG/1
5 TABLET, FILM COATED ORAL NIGHTLY
Qty: 30 TABLET | Refills: 2 | Status: SHIPPED | OUTPATIENT
Start: 2025-04-23

## 2025-04-23 NOTE — PROGRESS NOTES
Chief Complaint   Patient presents with    Medication Check      Vitals:    04/23/25 0931   BP: 123/60   Pulse: 74   Resp: 16   Temp: 98.2 °F (36.8 °C)   SpO2: 98%      Prior to Admission medications    Medication Sig Start Date End Date Taking? Authorizing Provider   donepezil (ARICEPT) 5 MG tablet Take 1 tablet by mouth nightly 3/26/25  Yes Kevin Miller ACNP   levothyroxine (SYNTHROID) 88 MCG tablet Take 1 tablet by mouth every morning (before breakfast) 6/24/24  Yes Sumeet Gaxiola MD   Cholecalciferol (VITAMIN D) 50 MCG (2000 UT) CAPS capsule Take by mouth   Yes ProviderSukhjinder MD   Multiple Vitamin (MULTIVITAMIN ADULT PO) Take 1 tablet by mouth daily   Yes ProviderSukhjinder MD   GLUCOSAMINE SULFATE PO Take 2 tablets by mouth daily   Yes ProviderSukhjinder MD   cyanocobalamin 1000 MCG tablet Take 1 tablet by mouth daily   Yes Automatic Reconciliation, Ar   estradiol (ESTRACE) 1 MG tablet Take 1 tablet by mouth daily 12/28/19  Yes Automatic Reconciliation, Ar      PHQ-9 Total Score: (Patient-Rptd) 0 (4/21/2025  4:30 PM)  Thoughts that you would be better off dead, or of hurting yourself in some way: (Patient-Rptd) 0 (4/21/2025  4:30 PM)         4/21/2025     4:30 PM 3/26/2025     1:30 PM 3/21/2025     3:19 PM   PHQ-9    Little interest or pleasure in doing things 0 0 0   Feeling down, depressed, or hopeless 0 1 0   Trouble falling or staying asleep, or sleeping too much 0 1    Feeling tired or having little energy 0 1    Poor appetite or overeating 0 1    Feeling bad about yourself - or that you are a failure or have let yourself or your family down 0 1    Trouble concentrating on things, such as reading the newspaper or watching television 0 2    Moving or speaking so slowly that other people could have noticed. Or the opposite - being so fidgety or restless that you have been moving around a lot more than usual 0 0    Thoughts that you would be better off dead, or of hurting yourself in

## 2025-06-12 SDOH — HEALTH STABILITY: PHYSICAL HEALTH: ON AVERAGE, HOW MANY DAYS PER WEEK DO YOU ENGAGE IN MODERATE TO STRENUOUS EXERCISE (LIKE A BRISK WALK)?: 4 DAYS

## 2025-06-12 SDOH — HEALTH STABILITY: PHYSICAL HEALTH: ON AVERAGE, HOW MANY MINUTES DO YOU ENGAGE IN EXERCISE AT THIS LEVEL?: 40 MIN

## 2025-06-12 ASSESSMENT — PATIENT HEALTH QUESTIONNAIRE - PHQ9
1. LITTLE INTEREST OR PLEASURE IN DOING THINGS: NOT AT ALL
SUM OF ALL RESPONSES TO PHQ QUESTIONS 1-9: 0
2. FEELING DOWN, DEPRESSED OR HOPELESS: NOT AT ALL
SUM OF ALL RESPONSES TO PHQ QUESTIONS 1-9: 0

## 2025-06-12 ASSESSMENT — LIFESTYLE VARIABLES
HOW OFTEN DO YOU HAVE A DRINK CONTAINING ALCOHOL: 4
HOW OFTEN DO YOU HAVE SIX OR MORE DRINKS ON ONE OCCASION: 1
HOW OFTEN DO YOU HAVE A DRINK CONTAINING ALCOHOL: 2-3 TIMES A WEEK
HOW MANY STANDARD DRINKS CONTAINING ALCOHOL DO YOU HAVE ON A TYPICAL DAY: 1
HOW MANY STANDARD DRINKS CONTAINING ALCOHOL DO YOU HAVE ON A TYPICAL DAY: 1 OR 2

## 2025-06-19 ENCOUNTER — OFFICE VISIT (OUTPATIENT)
Age: 75
End: 2025-06-19
Payer: MEDICARE

## 2025-06-19 VITALS
BODY MASS INDEX: 24.7 KG/M2 | TEMPERATURE: 97.7 F | HEART RATE: 73 BPM | RESPIRATION RATE: 16 BRPM | WEIGHT: 139.4 LBS | HEIGHT: 63 IN | SYSTOLIC BLOOD PRESSURE: 113 MMHG | OXYGEN SATURATION: 96 % | DIASTOLIC BLOOD PRESSURE: 68 MMHG

## 2025-06-19 DIAGNOSIS — H61.21 CERUMINOSIS, RIGHT: ICD-10-CM

## 2025-06-19 DIAGNOSIS — E03.9 ACQUIRED HYPOTHYROIDISM: ICD-10-CM

## 2025-06-19 DIAGNOSIS — E55.9 HYPOVITAMINOSIS D: ICD-10-CM

## 2025-06-19 DIAGNOSIS — Z00.00 MEDICARE ANNUAL WELLNESS VISIT, SUBSEQUENT: ICD-10-CM

## 2025-06-19 DIAGNOSIS — Z12.11 SCREENING FOR COLON CANCER: ICD-10-CM

## 2025-06-19 DIAGNOSIS — Z00.00 MEDICARE ANNUAL WELLNESS VISIT, SUBSEQUENT: Primary | ICD-10-CM

## 2025-06-19 PROCEDURE — 3017F COLORECTAL CA SCREEN DOC REV: CPT | Performed by: INTERNAL MEDICINE

## 2025-06-19 PROCEDURE — 99214 OFFICE O/P EST MOD 30 MIN: CPT | Performed by: INTERNAL MEDICINE

## 2025-06-19 PROCEDURE — 1123F ACP DISCUSS/DSCN MKR DOCD: CPT | Performed by: INTERNAL MEDICINE

## 2025-06-19 PROCEDURE — 1126F AMNT PAIN NOTED NONE PRSNT: CPT | Performed by: INTERNAL MEDICINE

## 2025-06-19 PROCEDURE — G8427 DOCREV CUR MEDS BY ELIG CLIN: HCPCS | Performed by: INTERNAL MEDICINE

## 2025-06-19 PROCEDURE — 1159F MED LIST DOCD IN RCRD: CPT | Performed by: INTERNAL MEDICINE

## 2025-06-19 PROCEDURE — G0439 PPPS, SUBSEQ VISIT: HCPCS | Performed by: INTERNAL MEDICINE

## 2025-06-19 PROCEDURE — G8399 PT W/DXA RESULTS DOCUMENT: HCPCS | Performed by: INTERNAL MEDICINE

## 2025-06-19 PROCEDURE — 1090F PRES/ABSN URINE INCON ASSESS: CPT | Performed by: INTERNAL MEDICINE

## 2025-06-19 PROCEDURE — 1036F TOBACCO NON-USER: CPT | Performed by: INTERNAL MEDICINE

## 2025-06-19 PROCEDURE — G8420 CALC BMI NORM PARAMETERS: HCPCS | Performed by: INTERNAL MEDICINE

## 2025-06-19 RX ORDER — ALENDRONATE SODIUM 70 MG/1
TABLET ORAL
COMMUNITY
Start: 2025-06-11

## 2025-06-19 RX ORDER — LEVOTHYROXINE SODIUM 88 UG/1
88 TABLET ORAL
Qty: 90 TABLET | Refills: 3 | Status: SHIPPED | OUTPATIENT
Start: 2025-06-19

## 2025-06-19 SDOH — ECONOMIC STABILITY: FOOD INSECURITY: WITHIN THE PAST 12 MONTHS, THE FOOD YOU BOUGHT JUST DIDN'T LAST AND YOU DIDN'T HAVE MONEY TO GET MORE.: NEVER TRUE

## 2025-06-19 SDOH — ECONOMIC STABILITY: FOOD INSECURITY: WITHIN THE PAST 12 MONTHS, YOU WORRIED THAT YOUR FOOD WOULD RUN OUT BEFORE YOU GOT MONEY TO BUY MORE.: NEVER TRUE

## 2025-06-19 NOTE — PROGRESS NOTES
This is a Medicare Annual Wellness Visit (AWV).   The patient's medical history has been reviewed in detail, and  the computerized patient record has been updated as reviewed.    Assessment and Plan:      Diagnosis Orders   1. Medicare annual wellness visit, subsequent  CBC with Auto Differential    Lipid Panel    Hemoglobin A1C    TSH    T4, Free      2. Screening for colon cancer  Occult Blood Stool Immunoassay      3. Acquired hypothyroidism  levothyroxine (SYNTHROID) 88 MCG tablet    TSH    T4, Free      4. Hypovitaminosis D  Vitamin D 25 Hydroxy      5. Ceruminosis, right  carbamide peroxide (DEBROX) 6.5 % otic solution    DISCONTINUED: carbamide peroxide (DEBROX) 6.5 % otic solution    DISCONTINUED: carbamide peroxide (DEBROX) 6.5 % otic solution    DISCONTINUED: carbamide peroxide (DEBROX) 6.5 % otic solution          Diagnoses #1-2 for Medicare Wellness/Preventive visit.    Due to significant separate problems unrelated to Medicare Wellness Visit, also addressed following diagnoses/problems at visit as below (diagnoses #3-5 above).    1-2:  Screenings reviewed at visit.    3:  Lab monitoring reviewed.  Continue current medication pending lab results/review.  Refill(s) and management reviewed.    4:  Lab monitoring reviewed.    5:  Exam findings and mgt reviewed.  Medication(s), management and follow-up based on response reviewed at visit.  Symptomatic management reviewed at visit.    Debrox failed to eRx x 3, so printed for pt at visit instead.    Assessment & Plan  1. Medicare wellness visit.  Her hematocrit and hemoglobin levels were within the normal range during her last visit in March 2025. The comprehensive metabolic panel was last conducted in June 2024. Her beta amyloid levels were assessed by behavioral health. The most recent A1c and thyroid tests were performed in June 2024, with a repeat thyroid test conducted on 03/27/2025 by behavioral health. Both B12 and folate levels were normal. The FIT

## 2025-06-19 NOTE — PROGRESS NOTES
RM:17    Chief Complaint   Patient presents with    Medicare AWV     Patient states that she has upcoming appointments for memory issues       Fasting Yes    Vitals:    06/19/25 0809   BP: 113/68   BP Site: Left Upper Arm   Patient Position: Sitting   BP Cuff Size: Large Adult   Pulse: 73   Resp: 16   Temp: 97.7 °F (36.5 °C)   TempSrc: Oral   SpO2: 96%   Weight: 63.2 kg (139 lb 6.4 oz)   Height: 1.6 m (5' 3\")        Have you been to the ER, urgent care clinic since your last visit?  Hospitalized since your last visit?   NO    Have you seen or consulted any other health care providers outside our system since your last visit?   NO              Click Here for Release of Records Request   AVS  education, follow up, and recommendations provided and addressed with patient.  services used to advise patient

## 2025-06-21 LAB
25(OH)D3 SERPL-MCNC: 45.5 NG/ML (ref 30–100)
BASOPHILS # BLD: 0.05 K/UL (ref 0–0.1)
BASOPHILS NFR BLD: 1 % (ref 0–1)
CHOLEST SERPL-MCNC: 210 MG/DL
DIFFERENTIAL METHOD BLD: ABNORMAL
EOSINOPHIL # BLD: 0.1 K/UL (ref 0–0.4)
EOSINOPHIL NFR BLD: 2 % (ref 0–7)
ERYTHROCYTE [DISTWIDTH] IN BLOOD BY AUTOMATED COUNT: 13.2 % (ref 11.5–14.5)
EST. AVERAGE GLUCOSE BLD GHB EST-MCNC: 108 MG/DL
HBA1C MFR BLD: 5.4 % (ref 4–5.6)
HCT VFR BLD AUTO: 38.4 % (ref 35–47)
HDLC SERPL-MCNC: 81 MG/DL
HDLC SERPL: 2.6 (ref 0–5)
HGB BLD-MCNC: 12.6 G/DL (ref 11.5–16)
IMM GRANULOCYTES # BLD AUTO: 0.03 K/UL (ref 0–0.04)
IMM GRANULOCYTES NFR BLD AUTO: 0.6 % (ref 0–0.5)
LDLC SERPL CALC-MCNC: 112.2 MG/DL (ref 0–100)
LYMPHOCYTES # BLD: 1.41 K/UL (ref 0.8–3.5)
LYMPHOCYTES NFR BLD: 28.3 % (ref 12–49)
MCH RBC QN AUTO: 30.7 PG (ref 26–34)
MCHC RBC AUTO-ENTMCNC: 32.8 G/DL (ref 30–36.5)
MCV RBC AUTO: 93.4 FL (ref 80–99)
MONOCYTES # BLD: 0.36 K/UL (ref 0–1)
MONOCYTES NFR BLD: 7.2 % (ref 5–13)
NEUTS SEG # BLD: 3.03 K/UL (ref 1.8–8)
NEUTS SEG NFR BLD: 60.9 % (ref 32–75)
NRBC # BLD: 0 K/UL (ref 0–0.01)
NRBC BLD-RTO: 0 PER 100 WBC
PLATELET # BLD AUTO: 256 K/UL (ref 150–400)
PMV BLD AUTO: 9.4 FL (ref 8.9–12.9)
RBC # BLD AUTO: 4.11 M/UL (ref 3.8–5.2)
T4 FREE SERPL-MCNC: 1.1 NG/DL (ref 0.8–1.5)
TRIGL SERPL-MCNC: 84 MG/DL
TSH SERPL DL<=0.05 MIU/L-ACNC: 9.4 UIU/ML (ref 0.36–3.74)
VLDLC SERPL CALC-MCNC: 16.8 MG/DL
WBC # BLD AUTO: 5 K/UL (ref 3.6–11)

## 2025-06-28 LAB — HEMOCCULT STL QL IA: NEGATIVE

## 2025-07-02 DIAGNOSIS — Z12.11 SCREENING FOR COLON CANCER: ICD-10-CM

## 2025-07-20 ASSESSMENT — PATIENT HEALTH QUESTIONNAIRE - PHQ9
10. IF YOU CHECKED OFF ANY PROBLEMS, HOW DIFFICULT HAVE THESE PROBLEMS MADE IT FOR YOU TO DO YOUR WORK, TAKE CARE OF THINGS AT HOME, OR GET ALONG WITH OTHER PEOPLE: NOT DIFFICULT AT ALL
5. POOR APPETITE OR OVEREATING: NOT AT ALL
4. FEELING TIRED OR HAVING LITTLE ENERGY: NOT AT ALL
SUM OF ALL RESPONSES TO PHQ QUESTIONS 1-9: 0
SUM OF ALL RESPONSES TO PHQ QUESTIONS 1-9: 0
6. FEELING BAD ABOUT YOURSELF - OR THAT YOU ARE A FAILURE OR HAVE LET YOURSELF OR YOUR FAMILY DOWN: NOT AT ALL
8. MOVING OR SPEAKING SO SLOWLY THAT OTHER PEOPLE COULD HAVE NOTICED. OR THE OPPOSITE, BEING SO FIGETY OR RESTLESS THAT YOU HAVE BEEN MOVING AROUND A LOT MORE THAN USUAL: NOT AT ALL
9. THOUGHTS THAT YOU WOULD BE BETTER OFF DEAD, OR OF HURTING YOURSELF: NOT AT ALL
1. LITTLE INTEREST OR PLEASURE IN DOING THINGS: NOT AT ALL
SUM OF ALL RESPONSES TO PHQ QUESTIONS 1-9: 0
1. LITTLE INTEREST OR PLEASURE IN DOING THINGS: NOT AT ALL
4. FEELING TIRED OR HAVING LITTLE ENERGY: NOT AT ALL
3. TROUBLE FALLING OR STAYING ASLEEP: NOT AT ALL
7. TROUBLE CONCENTRATING ON THINGS, SUCH AS READING THE NEWSPAPER OR WATCHING TELEVISION: NOT AT ALL
8. MOVING OR SPEAKING SO SLOWLY THAT OTHER PEOPLE COULD HAVE NOTICED. OR THE OPPOSITE - BEING SO FIDGETY OR RESTLESS THAT YOU HAVE BEEN MOVING AROUND A LOT MORE THAN USUAL: NOT AT ALL
SUM OF ALL RESPONSES TO PHQ QUESTIONS 1-9: 0
3. TROUBLE FALLING OR STAYING ASLEEP: NOT AT ALL
SUM OF ALL RESPONSES TO PHQ QUESTIONS 1-9: 0
2. FEELING DOWN, DEPRESSED OR HOPELESS: NOT AT ALL
9. THOUGHTS THAT YOU WOULD BE BETTER OFF DEAD, OR OF HURTING YOURSELF: NOT AT ALL
10. IF YOU CHECKED OFF ANY PROBLEMS, HOW DIFFICULT HAVE THESE PROBLEMS MADE IT FOR YOU TO DO YOUR WORK, TAKE CARE OF THINGS AT HOME, OR GET ALONG WITH OTHER PEOPLE: NOT DIFFICULT AT ALL
5. POOR APPETITE OR OVEREATING: NOT AT ALL
2. FEELING DOWN, DEPRESSED OR HOPELESS: NOT AT ALL
7. TROUBLE CONCENTRATING ON THINGS, SUCH AS READING THE NEWSPAPER OR WATCHING TELEVISION: NOT AT ALL
6. FEELING BAD ABOUT YOURSELF - OR THAT YOU ARE A FAILURE OR HAVE LET YOURSELF OR YOUR FAMILY DOWN: NOT AT ALL

## 2025-07-20 ASSESSMENT — ANXIETY QUESTIONNAIRES
1. FEELING NERVOUS, ANXIOUS, OR ON EDGE: NOT AT ALL
7. FEELING AFRAID AS IF SOMETHING AWFUL MIGHT HAPPEN: NOT AT ALL
6. BECOMING EASILY ANNOYED OR IRRITABLE: NOT AT ALL
6. BECOMING EASILY ANNOYED OR IRRITABLE: NOT AT ALL
3. WORRYING TOO MUCH ABOUT DIFFERENT THINGS: NOT AT ALL
2. NOT BEING ABLE TO STOP OR CONTROL WORRYING: NOT AT ALL
7. FEELING AFRAID AS IF SOMETHING AWFUL MIGHT HAPPEN: NOT AT ALL
GAD7 TOTAL SCORE: 0
4. TROUBLE RELAXING: NOT AT ALL
1. FEELING NERVOUS, ANXIOUS, OR ON EDGE: NOT AT ALL
5. BEING SO RESTLESS THAT IT IS HARD TO SIT STILL: NOT AT ALL
4. TROUBLE RELAXING: NOT AT ALL
5. BEING SO RESTLESS THAT IT IS HARD TO SIT STILL: NOT AT ALL
3. WORRYING TOO MUCH ABOUT DIFFERENT THINGS: NOT AT ALL
2. NOT BEING ABLE TO STOP OR CONTROL WORRYING: NOT AT ALL

## 2025-07-22 ASSESSMENT — COLUMBIA-SUICIDE SEVERITY RATING SCALE - C-SSRS
2. HAVE YOU ACTUALLY HAD ANY THOUGHTS OF KILLING YOURSELF?: 0
6. IN YOUR LIFETIME, HAVE YOU EVER DONE ANYTHING, STARTED TO DO ANYTHING, OR PREPARED TO DO ANYTHING TO END YOUR LIFE?: 0
1. IN THE PAST MONTH, HAVE YOU WISHED YOU WERE DEAD OR WISHED YOU COULD GO TO SLEEP AND NOT WAKE UP?: 0

## 2025-07-23 ENCOUNTER — OFFICE VISIT (OUTPATIENT)
Age: 75
End: 2025-07-23
Payer: MEDICARE

## 2025-07-23 VITALS
RESPIRATION RATE: 16 BRPM | BODY MASS INDEX: 24.63 KG/M2 | OXYGEN SATURATION: 98 % | DIASTOLIC BLOOD PRESSURE: 73 MMHG | WEIGHT: 139 LBS | SYSTOLIC BLOOD PRESSURE: 134 MMHG | TEMPERATURE: 98.2 F | HEART RATE: 77 BPM | HEIGHT: 63 IN

## 2025-07-23 DIAGNOSIS — R41.3 MEMORY DISTURBANCE: ICD-10-CM

## 2025-07-23 PROCEDURE — 99213 OFFICE O/P EST LOW 20 MIN: CPT | Performed by: NURSE PRACTITIONER

## 2025-07-23 PROCEDURE — 1126F AMNT PAIN NOTED NONE PRSNT: CPT | Performed by: NURSE PRACTITIONER

## 2025-07-23 PROCEDURE — G8427 DOCREV CUR MEDS BY ELIG CLIN: HCPCS | Performed by: NURSE PRACTITIONER

## 2025-07-23 PROCEDURE — G8420 CALC BMI NORM PARAMETERS: HCPCS | Performed by: NURSE PRACTITIONER

## 2025-07-23 PROCEDURE — 3017F COLORECTAL CA SCREEN DOC REV: CPT | Performed by: NURSE PRACTITIONER

## 2025-07-23 PROCEDURE — 1036F TOBACCO NON-USER: CPT | Performed by: NURSE PRACTITIONER

## 2025-07-23 PROCEDURE — G8399 PT W/DXA RESULTS DOCUMENT: HCPCS | Performed by: NURSE PRACTITIONER

## 2025-07-23 PROCEDURE — 1159F MED LIST DOCD IN RCRD: CPT | Performed by: NURSE PRACTITIONER

## 2025-07-23 PROCEDURE — 1123F ACP DISCUSS/DSCN MKR DOCD: CPT | Performed by: NURSE PRACTITIONER

## 2025-07-23 PROCEDURE — 1090F PRES/ABSN URINE INCON ASSESS: CPT | Performed by: NURSE PRACTITIONER

## 2025-07-23 RX ORDER — DONEPEZIL HYDROCHLORIDE 5 MG/1
5 TABLET, FILM COATED ORAL NIGHTLY
Qty: 30 TABLET | Refills: 3 | Status: SHIPPED | OUTPATIENT
Start: 2025-07-23

## 2025-07-23 NOTE — PROGRESS NOTES
Chief Complaint   Patient presents with    Medication Check      Vitals:    07/23/25 1132   BP: 134/73   Pulse: 77   Resp: 16   Temp: 98.2 °F (36.8 °C)   SpO2: 98%      Prior to Admission medications    Medication Sig Start Date End Date Taking? Authorizing Provider   methylPREDNISolone (MEDROL DOSEPACK) 4 MG tablet Take by mouth. 7/3/25   Moisés Castaneda MD   alendronate (FOSAMAX) 70 MG tablet  6/11/25   Sukhjinder Arredondo MD   levothyroxine (SYNTHROID) 88 MCG tablet Take 1 tablet by mouth every morning (before breakfast) 6/19/25   Sumeet Gaxiola MD   donepezil (ARICEPT) 5 MG tablet Take 1 tablet by mouth nightly 4/23/25   Kevin Miller ACNP   Cholecalciferol (VITAMIN D) 50 MCG (2000 UT) CAPS capsule Take by mouth    Sukhjinder Arredondo MD   Multiple Vitamin (MULTIVITAMIN ADULT PO) Take 1 tablet by mouth daily    Sukhjinder Arredondo MD   GLUCOSAMINE SULFATE PO Take 2 tablets by mouth daily    Sukhjinder Arredondo MD   cyanocobalamin 1000 MCG tablet Take 1 tablet by mouth daily    Automatic Reconciliation, Ar   estradiol (ESTRACE) 1 MG tablet Take 1 tablet by mouth daily 12/28/19   Automatic Reconciliation, Ar          7/20/2025     3:57 PM 6/12/2025    11:19 AM 4/21/2025     4:30 PM   PHQ-9    Little interest or pleasure in doing things 0 0 0   Feeling down, depressed, or hopeless 0 0 0   Trouble falling or staying asleep, or sleeping too much 0  0   Feeling tired or having little energy 0  0   Poor appetite or overeating 0  0   Feeling bad about yourself - or that you are a failure or have let yourself or your family down 0  0   Trouble concentrating on things, such as reading the newspaper or watching television 0  0   Moving or speaking so slowly that other people could have noticed. Or the opposite - being so fidgety or restless that you have been moving around a lot more than usual 0  0   Thoughts that you would be better off dead, or of hurting yourself in some way 0  0   PHQ-2 Score 0  0  0  
agreed to alert others and to seek help promptly if they would experience any thoughts of life not being worth living.     Plan:  1. MEDICATION:        medication changes made today:   Orders Placed This Encounter   Medications    donepezil (ARICEPT) 5 MG tablet     Sig: Take 1 tablet by mouth nightly     Dispense:  30 tablet     Refill:  3       2.  Counseling and coordination of care including instructions for treatment, risks/benefits, risk factor reduction and patient/family education. She agrees with the plan. Patient instructed to call with any side effects, questions or issues.   3.PSYCHOTHERAPY: Patient was provided with supportive therapy, strongly encourage to seek psychotherapy.    4. MEDICAL CARE: Patient was strongly encourage to take their medical medications and follow up with their PCP on regular basis.    5. SUBSTANCE ABUSE CARE: Patient denies a smoking, drinking, abusing any illicit drugs.  6. Patient was informed that in case of emergency to go to the nearest ER or call 911.     Patient was given time to ask questions and voice concerns. I believe all questions concerns were adequately addressed at this office visit. Patient verbalized agreement and understanding of the above-stated plan.    Follow-up and Dispositions    Return in about 3 months (around 10/23/2025) for follow up for medication check.         No LOS data to display       7/23/2025  KYLAH Verde

## 2025-08-05 ENCOUNTER — AMBULATORY SURGICAL CENTER (OUTPATIENT)
Dept: URBAN - METROPOLITAN AREA SURGERY 6 | Facility: SURGERY | Age: 75
End: 2025-08-05
Payer: MEDICARE

## 2025-08-05 ENCOUNTER — OFFICE (OUTPATIENT)
Dept: URBAN - METROPOLITAN AREA PATHOLOGY 6 | Facility: PATHOLOGY | Age: 75
End: 2025-08-05
Payer: MEDICARE

## 2025-08-05 VITALS
OXYGEN SATURATION: 97 % | HEART RATE: 78 BPM | HEART RATE: 57 BPM | OXYGEN SATURATION: 97 % | HEART RATE: 54 BPM | SYSTOLIC BLOOD PRESSURE: 127 MMHG | WEIGHT: 184.4 LBS | HEART RATE: 66 BPM | DIASTOLIC BLOOD PRESSURE: 119 MMHG | DIASTOLIC BLOOD PRESSURE: 99 MMHG | DIASTOLIC BLOOD PRESSURE: 84 MMHG | WEIGHT: 184.4 LBS | SYSTOLIC BLOOD PRESSURE: 125 MMHG | RESPIRATION RATE: 13 BRPM | HEIGHT: 64 IN | OXYGEN SATURATION: 96 % | DIASTOLIC BLOOD PRESSURE: 57 MMHG | DIASTOLIC BLOOD PRESSURE: 84 MMHG | DIASTOLIC BLOOD PRESSURE: 57 MMHG | OXYGEN SATURATION: 97 % | RESPIRATION RATE: 8 BRPM | RESPIRATION RATE: 8 BRPM | HEART RATE: 54 BPM | RESPIRATION RATE: 13 BRPM | RESPIRATION RATE: 16 BRPM | WEIGHT: 184.4 LBS | OXYGEN SATURATION: 96 % | SYSTOLIC BLOOD PRESSURE: 144 MMHG | SYSTOLIC BLOOD PRESSURE: 141 MMHG | HEART RATE: 57 BPM | SYSTOLIC BLOOD PRESSURE: 122 MMHG | DIASTOLIC BLOOD PRESSURE: 99 MMHG | RESPIRATION RATE: 13 BRPM | SYSTOLIC BLOOD PRESSURE: 127 MMHG | HEART RATE: 54 BPM | HEART RATE: 66 BPM | SYSTOLIC BLOOD PRESSURE: 144 MMHG | HEART RATE: 57 BPM | OXYGEN SATURATION: 92 % | HEART RATE: 53 BPM | DIASTOLIC BLOOD PRESSURE: 84 MMHG | DIASTOLIC BLOOD PRESSURE: 119 MMHG | OXYGEN SATURATION: 96 % | HEART RATE: 56 BPM | RESPIRATION RATE: 16 BRPM | RESPIRATION RATE: 18 BRPM | SYSTOLIC BLOOD PRESSURE: 125 MMHG | HEART RATE: 78 BPM | OXYGEN SATURATION: 91 % | OXYGEN SATURATION: 99 % | SYSTOLIC BLOOD PRESSURE: 141 MMHG | RESPIRATION RATE: 18 BRPM | DIASTOLIC BLOOD PRESSURE: 99 MMHG | HEIGHT: 64 IN | HEART RATE: 53 BPM | OXYGEN SATURATION: 99 % | HEART RATE: 56 BPM | OXYGEN SATURATION: 92 % | HEART RATE: 56 BPM | OXYGEN SATURATION: 91 % | SYSTOLIC BLOOD PRESSURE: 141 MMHG | HEIGHT: 64 IN | SYSTOLIC BLOOD PRESSURE: 144 MMHG | HEART RATE: 53 BPM | OXYGEN SATURATION: 91 % | DIASTOLIC BLOOD PRESSURE: 73 MMHG | DIASTOLIC BLOOD PRESSURE: 57 MMHG | OXYGEN SATURATION: 99 % | TEMPERATURE: 96.3 F | TEMPERATURE: 96.3 F | SYSTOLIC BLOOD PRESSURE: 122 MMHG | DIASTOLIC BLOOD PRESSURE: 73 MMHG | HEART RATE: 78 BPM | RESPIRATION RATE: 18 BRPM | SYSTOLIC BLOOD PRESSURE: 122 MMHG | RESPIRATION RATE: 16 BRPM | DIASTOLIC BLOOD PRESSURE: 119 MMHG | DIASTOLIC BLOOD PRESSURE: 73 MMHG | TEMPERATURE: 96.3 F | OXYGEN SATURATION: 92 % | SYSTOLIC BLOOD PRESSURE: 125 MMHG | HEART RATE: 66 BPM | SYSTOLIC BLOOD PRESSURE: 127 MMHG | RESPIRATION RATE: 8 BRPM

## 2025-08-05 DIAGNOSIS — K31.89 OTHER DISEASES OF STOMACH AND DUODENUM: ICD-10-CM

## 2025-08-05 DIAGNOSIS — D17.5 BENIGN LIPOMATOUS NEOPLASM OF INTRA-ABDOMINAL ORGANS: ICD-10-CM

## 2025-08-05 DIAGNOSIS — K44.9 DIAPHRAGMATIC HERNIA WITHOUT OBSTRUCTION OR GANGRENE: ICD-10-CM

## 2025-08-05 DIAGNOSIS — K31.7 POLYP OF STOMACH AND DUODENUM: ICD-10-CM

## 2025-08-05 DIAGNOSIS — K21.9 GASTRO-ESOPHAGEAL REFLUX DISEASE WITHOUT ESOPHAGITIS: ICD-10-CM

## 2025-08-05 DIAGNOSIS — K21.00 GASTRO-ESOPHAGEAL REFLUX DISEASE WITH ESOPHAGITIS, WITHOUT B: ICD-10-CM

## 2025-08-05 PROBLEM — K20.80 OTHER ESOPHAGITIS WITHOUT BLEEDING: Status: ACTIVE | Noted: 2025-08-05

## 2025-08-05 PROBLEM — K22.70 BARRETT'S ESOPHAGUS WITHOUT DYSPLASIA: Status: ACTIVE | Noted: 2025-08-05

## 2025-08-05 PROCEDURE — 88313 SPECIAL STAINS GROUP 2: CPT | Performed by: PATHOLOGY

## 2025-08-05 PROCEDURE — 43239 EGD BIOPSY SINGLE/MULTIPLE: CPT | Performed by: INTERNAL MEDICINE

## 2025-08-05 PROCEDURE — 88305 TISSUE EXAM BY PATHOLOGIST: CPT | Performed by: PATHOLOGY

## 2025-08-05 PROCEDURE — 88342 IMHCHEM/IMCYTCHM 1ST ANTB: CPT | Performed by: PATHOLOGY

## 2025-08-20 ENCOUNTER — APPOINTMENT (OUTPATIENT)
Age: 75
End: 2025-08-20
Payer: MEDICARE

## 2025-08-20 ENCOUNTER — ANCILLARY PROCEDURE (OUTPATIENT)
Age: 75
End: 2025-08-20
Payer: MEDICARE

## 2025-08-20 ENCOUNTER — OFFICE VISIT (OUTPATIENT)
Age: 75
End: 2025-08-20
Payer: MEDICARE

## 2025-08-20 VITALS
SYSTOLIC BLOOD PRESSURE: 136 MMHG | RESPIRATION RATE: 12 BRPM | BODY MASS INDEX: 23.74 KG/M2 | DIASTOLIC BLOOD PRESSURE: 62 MMHG | HEART RATE: 67 BPM | TEMPERATURE: 96 F | WEIGHT: 134 LBS | OXYGEN SATURATION: 99 %

## 2025-08-20 DIAGNOSIS — R41.3 MEMORY DEFICITS: Primary | ICD-10-CM

## 2025-08-20 DIAGNOSIS — R41.3 MEMORY DEFICITS: ICD-10-CM

## 2025-08-20 PROCEDURE — G8427 DOCREV CUR MEDS BY ELIG CLIN: HCPCS | Performed by: PSYCHIATRY & NEUROLOGY

## 2025-08-20 PROCEDURE — 1036F TOBACCO NON-USER: CPT | Performed by: PSYCHIATRY & NEUROLOGY

## 2025-08-20 PROCEDURE — 1159F MED LIST DOCD IN RCRD: CPT | Performed by: PSYCHIATRY & NEUROLOGY

## 2025-08-20 PROCEDURE — 99204 OFFICE O/P NEW MOD 45 MIN: CPT | Performed by: PSYCHIATRY & NEUROLOGY

## 2025-08-20 PROCEDURE — G8399 PT W/DXA RESULTS DOCUMENT: HCPCS | Performed by: PSYCHIATRY & NEUROLOGY

## 2025-08-20 PROCEDURE — 95816 EEG AWAKE AND DROWSY: CPT | Performed by: PSYCHIATRY & NEUROLOGY

## 2025-08-20 PROCEDURE — 96138 PSYCL/NRPSYC TECH 1ST: CPT | Performed by: PSYCHIATRY & NEUROLOGY

## 2025-08-20 PROCEDURE — 1090F PRES/ABSN URINE INCON ASSESS: CPT | Performed by: PSYCHIATRY & NEUROLOGY

## 2025-08-20 PROCEDURE — 3017F COLORECTAL CA SCREEN DOC REV: CPT | Performed by: PSYCHIATRY & NEUROLOGY

## 2025-08-20 PROCEDURE — 96132 NRPSYC TST EVAL PHYS/QHP 1ST: CPT | Performed by: PSYCHIATRY & NEUROLOGY

## 2025-08-20 PROCEDURE — 1123F ACP DISCUSS/DSCN MKR DOCD: CPT | Performed by: PSYCHIATRY & NEUROLOGY

## 2025-08-20 PROCEDURE — G8420 CALC BMI NORM PARAMETERS: HCPCS | Performed by: PSYCHIATRY & NEUROLOGY

## 2025-08-25 ENCOUNTER — HOSPITAL ENCOUNTER (OUTPATIENT)
Facility: HOSPITAL | Age: 75
Discharge: HOME | End: 2025-08-28
Attending: PSYCHIATRY & NEUROLOGY
Payer: MEDICARE

## 2025-08-25 DIAGNOSIS — R41.3 MEMORY DEFICITS: ICD-10-CM

## 2025-08-25 PROCEDURE — 70551 MRI BRAIN STEM W/O DYE: CPT

## 2025-08-26 ENCOUNTER — PROCEDURE VISIT (OUTPATIENT)
Age: 75
End: 2025-08-26
Payer: MEDICARE

## 2025-08-26 DIAGNOSIS — R41.0 CONFUSION: Primary | ICD-10-CM

## 2025-08-26 PROCEDURE — 95816 EEG AWAKE AND DROWSY: CPT | Performed by: PSYCHIATRY & NEUROLOGY

## 2025-08-27 ENCOUNTER — TELEPHONE (OUTPATIENT)
Age: 75
End: 2025-08-27